# Patient Record
Sex: FEMALE | Race: WHITE | NOT HISPANIC OR LATINO | Employment: OTHER | URBAN - METROPOLITAN AREA
[De-identification: names, ages, dates, MRNs, and addresses within clinical notes are randomized per-mention and may not be internally consistent; named-entity substitution may affect disease eponyms.]

---

## 2017-05-01 DIAGNOSIS — Z12.31 ENCOUNTER FOR SCREENING MAMMOGRAM FOR MALIGNANT NEOPLASM OF BREAST: ICD-10-CM

## 2017-05-08 ENCOUNTER — TRANSCRIBE ORDERS (OUTPATIENT)
Dept: ADMINISTRATIVE | Facility: HOSPITAL | Age: 47
End: 2017-05-08

## 2017-05-08 DIAGNOSIS — Z12.31 VISIT FOR SCREENING MAMMOGRAM: Primary | ICD-10-CM

## 2017-05-18 ENCOUNTER — ALLSCRIPTS OFFICE VISIT (OUTPATIENT)
Dept: OTHER | Facility: OTHER | Age: 47
End: 2017-05-18

## 2017-05-23 LAB
HPV 18 (HISTORICAL): NOT DETECTED
HPV HIGH RISK 16/18 (HISTORICAL): NOT DETECTED
HPV16 (HISTORICAL): NOT DETECTED
PAP (HISTORICAL): NORMAL

## 2017-06-04 ENCOUNTER — APPOINTMENT (EMERGENCY)
Dept: RADIOLOGY | Facility: HOSPITAL | Age: 47
End: 2017-06-04
Payer: COMMERCIAL

## 2017-06-04 ENCOUNTER — HOSPITAL ENCOUNTER (EMERGENCY)
Facility: HOSPITAL | Age: 47
Discharge: HOME | End: 2017-06-04
Attending: EMERGENCY MEDICINE
Payer: COMMERCIAL

## 2017-06-04 VITALS
TEMPERATURE: 98.1 F | WEIGHT: 177.25 LBS | OXYGEN SATURATION: 97 % | RESPIRATION RATE: 16 BRPM | DIASTOLIC BLOOD PRESSURE: 71 MMHG | HEART RATE: 75 BPM | SYSTOLIC BLOOD PRESSURE: 132 MMHG

## 2017-06-04 DIAGNOSIS — R20.2 PARESTHESIAS: Primary | ICD-10-CM

## 2017-06-04 LAB
ANION GAP SERPL CALCULATED.3IONS-SCNC: 10 MMOL/L (ref 4–13)
ATRIAL RATE: 60 BPM
BASOPHILS # BLD AUTO: 0.05 THOUSANDS/ΜL (ref 0–0.1)
BASOPHILS NFR BLD AUTO: 1 % (ref 0–1)
BUN SERPL-MCNC: 13 MG/DL (ref 5–25)
CALCIUM SERPL-MCNC: 9.1 MG/DL (ref 8.3–10.1)
CHLORIDE SERPL-SCNC: 105 MMOL/L (ref 100–108)
CO2 SERPL-SCNC: 28 MMOL/L (ref 21–32)
CREAT SERPL-MCNC: 0.67 MG/DL (ref 0.6–1.3)
DEPRECATED D DIMER PPP: <270 NG/ML (FEU) (ref 0–424)
EOSINOPHIL # BLD AUTO: 0.08 THOUSAND/ΜL (ref 0–0.61)
EOSINOPHIL NFR BLD AUTO: 1 % (ref 0–6)
ERYTHROCYTE [DISTWIDTH] IN BLOOD BY AUTOMATED COUNT: 12.2 % (ref 11.6–15.1)
GFR SERPL CREATININE-BSD FRML MDRD: >60 ML/MIN/1.73SQ M
GLUCOSE SERPL-MCNC: 111 MG/DL (ref 65–140)
HCT VFR BLD AUTO: 39.4 % (ref 34.8–46.1)
HGB BLD-MCNC: 12.9 G/DL (ref 11.5–15.4)
LYMPHOCYTES # BLD AUTO: 2.38 THOUSANDS/ΜL (ref 0.6–4.47)
LYMPHOCYTES NFR BLD AUTO: 36 % (ref 14–44)
MCH RBC QN AUTO: 31.5 PG (ref 26.8–34.3)
MCHC RBC AUTO-ENTMCNC: 32.7 G/DL (ref 31.4–37.4)
MCV RBC AUTO: 96 FL (ref 82–98)
MONOCYTES # BLD AUTO: 0.68 THOUSAND/ΜL (ref 0.17–1.22)
MONOCYTES NFR BLD AUTO: 10 % (ref 4–12)
NEUTROPHILS # BLD AUTO: 3.43 THOUSANDS/ΜL (ref 1.85–7.62)
NEUTS SEG NFR BLD AUTO: 52 % (ref 43–75)
P AXIS: 62 DEGREES
PLATELET # BLD AUTO: 296 THOUSANDS/UL (ref 149–390)
PMV BLD AUTO: 10.2 FL (ref 8.9–12.7)
POTASSIUM SERPL-SCNC: 3.5 MMOL/L (ref 3.5–5.3)
PR INTERVAL: 192 MS
QRS AXIS: 51 DEGREES
QRSD INTERVAL: 92 MS
QT INTERVAL: 402 MS
QTC INTERVAL: 402 MS
RBC # BLD AUTO: 4.09 MILLION/UL (ref 3.81–5.12)
SODIUM SERPL-SCNC: 143 MMOL/L (ref 136–145)
T WAVE AXIS: 52 DEGREES
TROPONIN I SERPL-MCNC: <0.02 NG/ML
VENTRICULAR RATE: 60 BPM
WBC # BLD AUTO: 6.62 THOUSAND/UL (ref 4.31–10.16)

## 2017-06-04 PROCEDURE — 84484 ASSAY OF TROPONIN QUANT: CPT | Performed by: EMERGENCY MEDICINE

## 2017-06-04 PROCEDURE — 80048 BASIC METABOLIC PNL TOTAL CA: CPT | Performed by: EMERGENCY MEDICINE

## 2017-06-04 PROCEDURE — 71020 HB CHEST X-RAY 2VW FRONTAL&LATL: CPT

## 2017-06-04 PROCEDURE — 36415 COLL VENOUS BLD VENIPUNCTURE: CPT | Performed by: EMERGENCY MEDICINE

## 2017-06-04 PROCEDURE — 85025 COMPLETE CBC W/AUTO DIFF WBC: CPT | Performed by: EMERGENCY MEDICINE

## 2017-06-04 PROCEDURE — 85379 FIBRIN DEGRADATION QUANT: CPT | Performed by: EMERGENCY MEDICINE

## 2017-06-04 PROCEDURE — 99284 EMERGENCY DEPT VISIT MOD MDM: CPT

## 2017-06-04 PROCEDURE — 93005 ELECTROCARDIOGRAM TRACING: CPT | Performed by: EMERGENCY MEDICINE

## 2017-06-04 RX ORDER — METHYLPREDNISOLONE 4 MG/1
TABLET ORAL
Qty: 21 TABLET | Refills: 0 | Status: SHIPPED | OUTPATIENT
Start: 2017-06-04 | End: 2019-08-02

## 2017-06-15 ENCOUNTER — HOSPITAL ENCOUNTER (OUTPATIENT)
Dept: MAMMOGRAPHY | Facility: HOSPITAL | Age: 47
Discharge: HOME | End: 2017-06-15
Attending: OBSTETRICS & GYNECOLOGY
Payer: COMMERCIAL

## 2017-06-15 DIAGNOSIS — Z12.31 ENCOUNTER FOR SCREENING MAMMOGRAM FOR MALIGNANT NEOPLASM OF BREAST: ICD-10-CM

## 2017-06-15 PROCEDURE — G0202 SCR MAMMO BI INCL CAD: HCPCS

## 2018-01-13 VITALS
WEIGHT: 171 LBS | DIASTOLIC BLOOD PRESSURE: 88 MMHG | BODY MASS INDEX: 24.48 KG/M2 | HEIGHT: 70 IN | SYSTOLIC BLOOD PRESSURE: 142 MMHG

## 2018-09-26 ENCOUNTER — ANNUAL EXAM (OUTPATIENT)
Dept: OBGYN CLINIC | Facility: CLINIC | Age: 48
End: 2018-09-26
Payer: COMMERCIAL

## 2018-09-26 VITALS
HEIGHT: 70 IN | SYSTOLIC BLOOD PRESSURE: 118 MMHG | DIASTOLIC BLOOD PRESSURE: 82 MMHG | BODY MASS INDEX: 24.77 KG/M2 | WEIGHT: 173 LBS

## 2018-09-26 DIAGNOSIS — Z01.419 ENCOUNTER FOR GYNECOLOGICAL EXAMINATION WITHOUT ABNORMAL FINDING: Primary | ICD-10-CM

## 2018-09-26 DIAGNOSIS — Z12.39 SCREENING FOR MALIGNANT NEOPLASM OF BREAST: ICD-10-CM

## 2018-09-26 DIAGNOSIS — Z12.4 PAP SMEAR FOR CERVICAL CANCER SCREENING: ICD-10-CM

## 2018-09-26 PROCEDURE — S0612 ANNUAL GYNECOLOGICAL EXAMINA: HCPCS | Performed by: OBSTETRICS & GYNECOLOGY

## 2018-09-26 NOTE — PATIENT INSTRUCTIONS
Wellness Visit for Adults   AMBULATORY CARE:   A wellness visit  is when you see your healthcare provider to get screened for health problems  You can also get advice on how to stay healthy  Write down your questions so you remember to ask them  Ask your healthcare provider how often you should have a wellness visit  What happens at a wellness visit:  Your healthcare provider will ask about your health, and your family history of health problems  This includes high blood pressure, heart disease, and cancer  He or she will ask if you have symptoms that concern you, if you smoke, and about your mood  You may also be asked about your intake of medicines, supplements, food, and alcohol  Any of the following may be done:  · Your weight  will be checked  Your height may also be checked so your body mass index (BMI) can be calculated  Your BMI shows if you are at a healthy weight  · Your blood pressure  and heart rate will be checked  Your temperature may also be checked  · Blood and urine tests  may be done  Blood tests may be done to check your cholesterol levels  Abnormal cholesterol levels increase your risk for heart disease and stroke  You may also need a blood or urine test to check for diabetes if you are at increased risk  Urine tests may be done to look for signs of an infection or kidney disease  · A physical exam  includes checking your heartbeat and lungs with a stethoscope  Your healthcare provider may also check your skin to look for sun damage  · Screening tests  may be recommended  A screening test is done to check for diseases that may not cause symptoms  The screening tests you may need depend on your age, gender, family history, and lifestyle habits  For example, colorectal screening may be recommended if you are 48years old or older  Screening tests you need if you are a woman:   · A Pap smear  is used to screen for cervical cancer   Pap smears are usually done every 3 to 5 years depending on your age  You may need them more often if you have had abnormal Pap smear test results in the past  Ask your healthcare provider how often you should have a Pap smear  · A mammogram  is an x-ray of your breasts to screen for breast cancer  Experts recommend mammograms every 2 years starting at age 48 years  You may need a mammogram at age 52 years or younger if you have an increased risk for breast cancer  Talk to your healthcare provider about when you should start having mammograms and how often you need them  Vaccines you may need:   · Get an influenza vaccine  every year  The influenza vaccine protects you from the flu  Several types of viruses cause the flu  The viruses change over time, so new vaccines are made each year  · Get a tetanus-diphtheria (Td) booster vaccine  every 10 years  This vaccine protects you against tetanus and diphtheria  Tetanus is a severe infection that may cause painful muscle spasms and lockjaw  Diphtheria is a severe bacterial infection that causes a thick covering in the back of your mouth and throat  · Get a human papillomavirus (HPV) vaccine  if you are female and aged 23 to 32 or male 23 to 24 and never received it  This vaccine protects you from HPV infection  HPV is the most common infection spread by sexual contact  HPV may also cause vaginal, penile, and anal cancers  · Get a pneumococcal vaccine  if you are aged 72 years or older  The pneumococcal vaccine is an injection given to protect you from pneumococcal disease  Pneumococcal disease is an infection caused by pneumococcal bacteria  The infection may cause pneumonia, meningitis, or an ear infection  · Get a shingles vaccine  if you are aged 61 or older, even if you have had shingles before  The shingles vaccine is an injection to protect you from the varicella-zoster virus  This is the same virus that causes chickenpox   Shingles is a painful rash that develops in people who had chickenpox or have been exposed to the virus  How to eat healthy:  My Plate is a model for planning healthy meals  It shows the types and amounts of foods that should go on your plate  Fruits and vegetables make up about half of your plate, and grains and protein make up the other half  A serving of dairy is included on the side of your plate  The amount of calories and serving sizes you need depends on your age, gender, weight, and height  Examples of healthy foods are listed below:  · Eat a variety of vegetables  such as dark green, red, and orange vegetables  You can also include canned vegetables low in sodium (salt) and frozen vegetables without added butter or sauces  · Eat a variety of fresh fruits , canned fruit in 100% juice, frozen fruit, and dried fruit  · Include whole grains  At least half of the grains you eat should be whole grains  Examples include whole-wheat bread, wheat pasta, brown rice, and whole-grain cereals such as oatmeal     · Eat a variety of protein foods such as seafood (fish and shellfish), lean meat, and poultry without skin (turkey and chicken)  Examples of lean meats include pork leg, shoulder, or tenderloin, and beef round, sirloin, tenderloin, and extra lean ground beef  Other protein foods include eggs and egg substitutes, beans, peas, soy products, nuts, and seeds  · Choose low-fat dairy products such as skim or 1% milk or low-fat yogurt, cheese, and cottage cheese  · Limit unhealthy fats  such as butter, hard margarine, and shortening  Exercise:  Exercise at least 30 minutes per day on most days of the week  Some examples of exercise include walking, biking, dancing, and swimming  You can also fit in more physical activity by taking the stairs instead of the elevator or parking farther away from stores  Include muscle strengthening activities 2 days each week  Regular exercise provides many health benefits   It helps you manage your weight, and decreases your risk for type 2 diabetes, heart disease, stroke, and high blood pressure  Exercise can also help improve your mood  Ask your healthcare provider about the best exercise plan for you  General health and safety guidelines:   · Do not smoke  Nicotine and other chemicals in cigarettes and cigars can cause lung damage  Ask your healthcare provider for information if you currently smoke and need help to quit  E-cigarettes or smokeless tobacco still contain nicotine  Talk to your healthcare provider before you use these products  · Limit alcohol  A drink of alcohol is 12 ounces of beer, 5 ounces of wine, or 1½ ounces of liquor  · Lose weight, if needed  Being overweight increases your risk of certain health conditions  These include heart disease, high blood pressure, type 2 diabetes, and certain types of cancer  · Protect your skin  Do not sunbathe or use tanning beds  Use sunscreen with a SPF 15 or higher  Apply sunscreen at least 15 minutes before you go outside  Reapply sunscreen every 2 hours  Wear protective clothing, hats, and sunglasses when you are outside  · Drive safely  Always wear your seatbelt  Make sure everyone in your car wears a seatbelt  A seatbelt can save your life if you are in an accident  Do not use your cell phone when you are driving  This could distract you and cause an accident  Pull over if you need to make a call or send a text message  · Practice safe sex  Use latex condoms if are sexually active and have more than one partner  Your healthcare provider may recommend screening tests for sexually transmitted infections (STIs)  · Wear helmets, lifejackets, and protective gear  Always wear a helmet when you ride a bike or motorcycle, go skiing, or play sports that could cause a head injury  Wear protective equipment when you play sports  Wear a lifejacket when you are on a boat or doing water sports    © 2017 [address] [employer] Information is for End User's use only and may not be sold, redistributed or otherwise used for commercial purposes  All illustrations and images included in CareNotes® are the copyrighted property of A D A M , Inc  or Cortez Almaguer  The above information is an  only  It is not intended as medical advice for individual conditions or treatments  Talk to your doctor, nurse or pharmacist before following any medical regimen to see if it is safe and effective for you  Thank you for enrolling in [facility]  Please follow the instructions below to securely access your online medical record  [employer] allows you to send messages to your doctor, view your test results, renew your prescriptions, schedule appointments, and more  [employer] uses Single Sign on (SSO) Technology for you to log in and access our [employer], including [employer]  No more remembering multiple user names and passwords! We are going to guide you through, step by step, to help you set up your [employer] account which will provide access to your [employer] account  How Do I Sign Up? 1  In your Internet browser, go to [email or web address]       2  Click on the [facility] patient account and then click Dont have an                 Account? Create one now      3  Enter your demographic information and chose a user name (email address) and password  Think of one that is secure and easy to remember  Enter a Referral code if you have one (this is not your [employer] code ) Accept the Terms and Conditions and the Privacy Policy  4  Select your security questions that you will use to reset your password should you forget it  Click Submit  5  Enter your [employer] Activation Code exactly as it appears below  You will not need to use this code after you have completed the sign-up process  If you do not sign up before the expiration date, you must request a new code                           [employer] Activation Code: [email or web address]  Expires: 10/10/2018  5:28 PM    6  Confirm your email address  An email confirmation was sent to you  Please open that email and click Confirm your Email   You should then be redirected to our [address] [place] [address] Single sign on page, where you will log on with the user name and password you created! Proceed to the [employer] Icon to view your personal health information  Additional Information  If you have questions, you can e-mail patient  [email or web address] or call [phone] to talk to our customer support staff  Remember, [employer] is NOT to be used for urgent needs  For medical emergencies, dial 911

## 2018-09-26 NOTE — PROGRESS NOTES
Assessment/Plan:     Normal annual gynecological exam  Pap smear is done  Script is given for mammogram which is due shortly  Reviewed options for treatment of heavy menstrual flow       Problem List Items Addressed This Visit     Encounter for gynecological examination without abnormal finding - Primary    Relevant Orders    GP Liquid-Based Pap + HPV Plus    Pap smear for cervical cancer screening      Other Visit Diagnoses     Screening for malignant neoplasm of breast        Relevant Orders    Mammo screening bilateral w cad            Subjective:      Patient ID: Joshua Estrada is a 57 y o  female  Here for annual gyn exam - overall well - menses are regular and heavy but not cramping -  she sometimes feels that they are heavier than they should be, but she only uses tampons at night and then pads during the day, she is not sure why  She has tried Motrin occasionally to slow down the flow and has helped but sometimes she forgets take  We briefly discussed some of the other options available to decrease menstrual flow, at this time she feels that she should go ahead and try the Motrin on a regular basis with her menses as well as try to use a tampon to decrease some of which she feels is heavy flow  She otherwise has no other complaints  Bowels and bladder are normal   She is due for her mammogram and will give her a slip for that  She has a colonoscopy scheduled the next few months  She has had blood work within the last year or so and otherwise no other medical issues  She still struggles with some of the issues were 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  The following portions of the patient's history were reviewed and updated as appropriate:   She  has no past medical history on file    She   Patient Active Problem List    Diagnosis Date Noted    Pap smear for cervical cancer screening 09/27/2018    Encounter for gynecological examination without abnormal finding 09/26/2018     She  has a past surgical history that includes Hernia repair and Cervical cone biopsy  Her family history includes Diabetes in her family; Heart disease in her family; Hypertension in her family; Lymphoma in her sister  She  reports that she has quit smoking  She has never used smokeless tobacco  She reports that she drinks about 1 8 oz of alcohol per week   She reports that she does not use drugs  Current Outpatient Prescriptions   Medication Sig Dispense Refill    Methylprednisolone 4 MG TBPK Use as directed on package 21 tablet 0     No current facility-administered medications for this visit  Current Outpatient Prescriptions on File Prior to Visit   Medication Sig    Methylprednisolone 4 MG TBPK Use as directed on package     No current facility-administered medications on file prior to visit  She is allergic to penicillins       Review of Systems   Constitutional: Negative for fatigue, fever and unexpected weight change  HENT: Negative for dental problem, mouth sores, nosebleeds, rhinorrhea, sinus pain, sinus pressure and sore throat  Eyes: Negative for pain, discharge and visual disturbance  Respiratory: Negative for cough, chest tightness, shortness of breath and wheezing  Cardiovascular: Negative for chest pain, palpitations and leg swelling  Gastrointestinal: Negative for blood in stool, constipation, diarrhea, nausea and vomiting  Endocrine: Negative for polydipsia  Genitourinary: Negative for difficulty urinating, dyspareunia, dysuria, menstrual problem, pelvic pain, urgency, vaginal discharge and vaginal pain  Musculoskeletal: Negative for arthralgias, back pain and joint swelling  Allergic/Immunologic: Negative for environmental allergies  Neurological: Negative for seizures, light-headedness and headaches  Hematological: Does not bruise/bleed easily  Psychiatric/Behavioral: Negative for sleep disturbance  The patient is not nervous/anxious  Objective: There were no vitals taken for this visit  Physical Exam   Constitutional: She is oriented to person, place, and time  She appears well-developed and well-nourished  Tall young white female no apparent distress   HENT:   Head: Normocephalic and atraumatic  Neck: Normal range of motion  Neck supple  No thyromegaly present  Cardiovascular: Normal rate and regular rhythm  Pulmonary/Chest: Effort normal and breath sounds normal  No respiratory distress  She has no wheezes  She has no rales  She exhibits no tenderness  Right breast exhibits no inverted nipple, no mass, no nipple discharge, no skin change and no tenderness  Left breast exhibits no inverted nipple, no mass, no nipple discharge, no skin change and no tenderness  Breasts are symmetrical    Abdominal: Soft  She exhibits no distension and no mass  There is no tenderness  There is no rebound and no guarding  Genitourinary:   Genitourinary Comments: Normal female, no vulvar or vaginal lesions, cervix is grossly normal appearance and Pap smear is taken, uterus is anterior normal in size shape and mobility, no adnexal masses are palpated, and the exam is nontender  Rectal exam reveals normal sphincter tone, no masses and the stool is guaiac negative   Neurological: She is alert and oriented to person, place, and time  Psychiatric: She has a normal mood and affect  Her behavior is normal    Vitals reviewed

## 2018-09-27 PROBLEM — Z12.4 PAP SMEAR FOR CERVICAL CANCER SCREENING: Status: ACTIVE | Noted: 2018-09-27

## 2018-10-01 LAB
HPV HR 12 DNA CVX QL NAA+PROBE: NOT DETECTED
HPV16 DNA SPEC QL NAA+PROBE: NOT DETECTED
HPV18 DNA SPEC QL NAA+PROBE: NOT DETECTED
THIN PREP CVX: NORMAL

## 2019-08-01 ENCOUNTER — HOSPITAL ENCOUNTER (OUTPATIENT)
Dept: RADIOLOGY | Facility: HOSPITAL | Age: 49
Discharge: HOME | End: 2019-08-01
Attending: OBSTETRICS & GYNECOLOGY
Payer: COMMERCIAL

## 2019-08-01 VITALS — BODY MASS INDEX: 23.62 KG/M2 | WEIGHT: 165 LBS | HEIGHT: 70 IN

## 2019-08-01 DIAGNOSIS — Z12.39 SCREENING FOR MALIGNANT NEOPLASM OF BREAST: ICD-10-CM

## 2019-08-01 PROCEDURE — 77063 BREAST TOMOSYNTHESIS BI: CPT

## 2019-08-01 PROCEDURE — 77067 SCR MAMMO BI INCL CAD: CPT

## 2019-08-02 ENCOUNTER — APPOINTMENT (EMERGENCY)
Dept: RADIOLOGY | Facility: HOSPITAL | Age: 49
End: 2019-08-02
Payer: COMMERCIAL

## 2019-08-02 ENCOUNTER — HOSPITAL ENCOUNTER (EMERGENCY)
Facility: HOSPITAL | Age: 49
Discharge: HOME | End: 2019-08-02
Attending: EMERGENCY MEDICINE | Admitting: EMERGENCY MEDICINE
Payer: COMMERCIAL

## 2019-08-02 ENCOUNTER — TELEPHONE (OUTPATIENT)
Dept: OBGYN CLINIC | Facility: CLINIC | Age: 49
End: 2019-08-02

## 2019-08-02 ENCOUNTER — TELEPHONE (OUTPATIENT)
Dept: MAMMOGRAPHY | Facility: CLINIC | Age: 49
End: 2019-08-02

## 2019-08-02 VITALS
WEIGHT: 165 LBS | HEART RATE: 86 BPM | DIASTOLIC BLOOD PRESSURE: 80 MMHG | TEMPERATURE: 98.1 F | OXYGEN SATURATION: 97 % | RESPIRATION RATE: 18 BRPM | SYSTOLIC BLOOD PRESSURE: 137 MMHG | BODY MASS INDEX: 23.68 KG/M2

## 2019-08-02 DIAGNOSIS — N92.0 MENORRHAGIA WITH REGULAR CYCLE: Primary | ICD-10-CM

## 2019-08-02 DIAGNOSIS — D25.9 UTERINE FIBROID: Primary | ICD-10-CM

## 2019-08-02 DIAGNOSIS — N93.9 VAGINAL BLEEDING: ICD-10-CM

## 2019-08-02 LAB
ANION GAP SERPL CALCULATED.3IONS-SCNC: 8 MMOL/L (ref 4–13)
BACTERIA UR QL AUTO: ABNORMAL /HPF
BASOPHILS # BLD AUTO: 0.08 THOUSANDS/ΜL (ref 0–0.1)
BASOPHILS NFR BLD AUTO: 1 % (ref 0–1)
BILIRUB UR QL STRIP: NEGATIVE
BUN SERPL-MCNC: 9 MG/DL (ref 5–25)
CALCIUM SERPL-MCNC: 9.2 MG/DL (ref 8.3–10.1)
CHLORIDE SERPL-SCNC: 107 MMOL/L (ref 100–108)
CLARITY UR: CLEAR
CLARITY, POC: CLEAR
CO2 SERPL-SCNC: 26 MMOL/L (ref 21–32)
COLOR UR: YELLOW
COLOR, POC: YELLOW
CREAT SERPL-MCNC: 0.6 MG/DL (ref 0.6–1.3)
EOSINOPHIL # BLD AUTO: 0.09 THOUSAND/ΜL (ref 0–0.61)
EOSINOPHIL NFR BLD AUTO: 1 % (ref 0–6)
ERYTHROCYTE [DISTWIDTH] IN BLOOD BY AUTOMATED COUNT: 12.3 % (ref 11.6–15.1)
EXT PREG TEST URINE: NEGATIVE
EXT. CONTROL ED NAV: NORMAL
GFR SERPL CREATININE-BSD FRML MDRD: 110 ML/MIN/1.73SQ M
GLUCOSE SERPL-MCNC: 88 MG/DL (ref 65–140)
GLUCOSE UR STRIP-MCNC: NEGATIVE MG/DL
HCT VFR BLD AUTO: 37.7 % (ref 34.8–46.1)
HGB BLD-MCNC: 12.3 G/DL (ref 11.5–15.4)
HGB UR QL STRIP.AUTO: ABNORMAL
HYALINE CASTS #/AREA URNS LPF: ABNORMAL /LPF
IMM GRANULOCYTES # BLD AUTO: 0.01 THOUSAND/UL (ref 0–0.2)
IMM GRANULOCYTES NFR BLD AUTO: 0 % (ref 0–2)
KETONES UR STRIP-MCNC: ABNORMAL MG/DL
LEUKOCYTE ESTERASE UR QL STRIP: NEGATIVE
LYMPHOCYTES # BLD AUTO: 1.79 THOUSANDS/ΜL (ref 0.6–4.47)
LYMPHOCYTES NFR BLD AUTO: 26 % (ref 14–44)
MCH RBC QN AUTO: 31.8 PG (ref 26.8–34.3)
MCHC RBC AUTO-ENTMCNC: 32.6 G/DL (ref 31.4–37.4)
MCV RBC AUTO: 97 FL (ref 82–98)
MONOCYTES # BLD AUTO: 0.72 THOUSAND/ΜL (ref 0.17–1.22)
MONOCYTES NFR BLD AUTO: 11 % (ref 4–12)
NEUTROPHILS # BLD AUTO: 4.14 THOUSANDS/ΜL (ref 1.85–7.62)
NEUTS SEG NFR BLD AUTO: 61 % (ref 43–75)
NITRITE UR QL STRIP: NEGATIVE
NON-SQ EPI CELLS URNS QL MICRO: ABNORMAL /HPF
NRBC BLD AUTO-RTO: 0 /100 WBCS
PH UR STRIP.AUTO: 6.5 [PH] (ref 4.5–8)
PLATELET # BLD AUTO: 268 THOUSANDS/UL (ref 149–390)
PMV BLD AUTO: 10.1 FL (ref 8.9–12.7)
POTASSIUM SERPL-SCNC: 3.7 MMOL/L (ref 3.5–5.3)
PROT UR STRIP-MCNC: NEGATIVE MG/DL
RBC # BLD AUTO: 3.87 MILLION/UL (ref 3.81–5.12)
RBC #/AREA URNS AUTO: ABNORMAL /HPF
SODIUM SERPL-SCNC: 141 MMOL/L (ref 136–145)
SP GR UR STRIP.AUTO: 1.01 (ref 1–1.03)
UROBILINOGEN UR QL STRIP.AUTO: 0.2 E.U./DL
WBC # BLD AUTO: 6.83 THOUSAND/UL (ref 4.31–10.16)
WBC #/AREA URNS AUTO: ABNORMAL /HPF

## 2019-08-02 PROCEDURE — 85025 COMPLETE CBC W/AUTO DIFF WBC: CPT | Performed by: EMERGENCY MEDICINE

## 2019-08-02 PROCEDURE — 99242 OFF/OP CONSLTJ NEW/EST SF 20: CPT | Performed by: OBSTETRICS & GYNECOLOGY

## 2019-08-02 PROCEDURE — 99284 EMERGENCY DEPT VISIT MOD MDM: CPT

## 2019-08-02 PROCEDURE — 99284 EMERGENCY DEPT VISIT MOD MDM: CPT | Performed by: EMERGENCY MEDICINE

## 2019-08-02 PROCEDURE — 76856 US EXAM PELVIC COMPLETE: CPT

## 2019-08-02 PROCEDURE — 76830 TRANSVAGINAL US NON-OB: CPT

## 2019-08-02 PROCEDURE — 81001 URINALYSIS AUTO W/SCOPE: CPT

## 2019-08-02 PROCEDURE — 81025 URINE PREGNANCY TEST: CPT | Performed by: EMERGENCY MEDICINE

## 2019-08-02 PROCEDURE — 80048 BASIC METABOLIC PNL TOTAL CA: CPT | Performed by: EMERGENCY MEDICINE

## 2019-08-02 PROCEDURE — 36415 COLL VENOUS BLD VENIPUNCTURE: CPT | Performed by: EMERGENCY MEDICINE

## 2019-08-02 RX ORDER — TRANEXAMIC ACID 650 1/1
2 TABLET ORAL 3 TIMES DAILY PRN
Qty: 30 TABLET | Refills: 3 | Status: SHIPPED | OUTPATIENT
Start: 2019-08-02 | End: 2019-11-12 | Stop reason: ALTCHOICE

## 2019-08-02 NOTE — TELEPHONE ENCOUNTER
Email sent to pt at [email or web address]    Suleman Mitchelljono Arias,    Your follow up appointment is at [address] [place] [address], [id number] [place] [address], [address] [place] [address]    Your appointment is on Wednesday August 14th at 11:00am   Please arrive 10-15 minutes early to register  We have contacted your doctor to obtain your script so you dont need to bring that with you to the appointment  Just as a reminder: Do not wear any perfume, powder, lotion or deodorant on the Right breast or underarm area  If you have any questions or need to change your appointment, please dont hesitate to call me at the number below  Thanks,  Liya Diaz, MSN, RN, [address]  [address], [address]  [address]: [phone]  FX: [phone]  [email or web address]

## 2019-08-02 NOTE — CONSULTS
58 y/o  with heavy gushing menses with blood and clot and significant cramps from earlier today  Patient reported around midday was soaking 2 maxi pad every hour  Patient seen by ER staff and had ultrasound  Bleeding now significantly decreased  Had reported heavier menses to Dr Stephanie Riggs at time of annual last  and they had reviewed some options at that time  Reviewed with patient current finding of 8 cm submucosal fibroid and possible options to treat including lysteda, ocp, uterine artery embolization, and hysterectomy  Generally reviewed risks and benefits of each option and that currently as bleeding has slowed would watch bleeding, use motrin as needed for cramping and heavy flow, and follow up in office  Patient requested and was sent a script for lysteda to her home pharmacy as they are travelling next week in case bleeding increases again  Patient reports no self or family history of clot and is not a smoker  Fully counseled  OB History        2    Para   2    Term   2            AB        Living           SAB        TAB        Ectopic        Multiple        Live Births   2               History reviewed  No pertinent past medical history  Past Surgical History:   Procedure Laterality Date    CERVICAL CONE BIOPSY      HERNIA REPAIR           Current Facility-Administered Medications:     sodium chloride 0 9 % bolus 1,000 mL, 1,000 mL, Intravenous, Once, Ruth Garcia MD    Current Outpatient Medications:     Tranexamic Acid 650 MG TABS, Take 2 tablets by mouth 3 (three) times a day as needed (heavy menses), Disp: 30 tablet, Rfl: 3    Allergies   Allergen Reactions    Penicillins Rash       Social History     Tobacco Use    Smoking status: Former Smoker    Smokeless tobacco: Never Used   Substance Use Topics    Alcohol use:  Yes     Alcohol/week: 3 0 standard drinks     Types: 3 Glasses of wine per week     Comment: weekly    Drug use: No       Vitals: 08/02/19 1340 08/02/19 1541   BP: 163/89 137/80   BP Location:  Right arm   Pulse: 75 86   Resp: 19 18   Temp: 98 1 °F (36 7 °C)    SpO2: 98% 97%   Weight: 74 8 kg (165 lb)        Pelvic: some dark blood, no active bleeding, no CMT, uterus mildly tender anteverted about 12 week size  Lab Results   Component Value Date    WBC 6 83 08/02/2019    HGB 12 3 08/02/2019    HCT 37 7 08/02/2019    MCV 97 08/02/2019     08/02/2019     PELVIC ULTRASOUND, COMPLETE     INDICATION: Vaginal bleeding with lower abdominal pain      COMPARISON: Pelvic ultrasound 8/27/2010     TECHNIQUE:   Transabdominal pelvic ultrasound was performed in sagittal and transverse planes with a curvilinear transducer  Additional transvaginal imaging was performed to better evaluate the endometrium and ovaries  Imaging included volumetric   sweeps as well as traditional still imaging technique      FINDINGS:     UTERUS:  The uterus is anteverted in position, measuring 14 0 x 9 1 x 9 8 cm  Contour appears normal    8 0 x 6 8 x 7 3 cm well-circumscribed, hypoechoic uterine body/fundus mass  This causes displacement/distortion of the endometrium      The cervix shows no suspicious abnormality      ENDOMETRIUM:    Thickened endometrium measuring up to 21 mm is seen  There is internal vascularity noted with arterial flow       OVARIES/ADNEXA:  Right ovary:  2 1 x 1 1 x 1 5 cm  No suspicious right ovarian abnormality  Doppler flow within normal limits      Left ovary:  3 1 x 1 9 x 2 0 cm  No suspicious left ovarian abnormality  Doppler flow within normal limits      No suspicious adnexal mass or loculated collections  There is no free fluid      IMPRESSION:     8 cm well-circumscribed posterior uterine body/fundus mass causing displacement/distortion of the endometrial stripe most compatible with a submucosal fibroid      Endometrium is thickened and vascular, significance unclear  Follow-up may be based on clinical grounds  Unremarkable ovaries  Assessment: 56 y/o  submucosal fibroid heavy menses now resolving, stable vitals and hgb  Plan: follow up in office, use lysteda and motrin prn as needed  Consider options to treat as discussed

## 2019-08-02 NOTE — ED PROVIDER NOTES
History  Chief Complaint   Patient presents with    Vaginal Bleeding     2d of excessive vaginal bleeding  +dizziness and feeling faint  +cramping  +hot flashes  doubled up on pads Qhour with clots  HPI  57 y o  Female  presents to the ED with heavy vaginal bleeding on her menstrual period  Pt reports she has had heavy bleeding during her menstrual period for several months, and was told by her OBGYN that this was normal jus-menopause  The first day of her period this month was two days ago, and pt reports she has noticed even more bleeding than usual  States she is soaking a pad and a tampon every hour and is passing small clots  Also reports feeling lightheaded today while sitting  Notes some mild lower abdominal cramping  She called her OBGYN's office and was advised to come to the ED  Pt denies hx ovarian cysts or uterine fibroids  She has hx 1 C section and 1 vaginal delivery  Prior to Admission Medications   Prescriptions Last Dose Informant Patient Reported? Taking? Tranexamic Acid 650 MG TABS   No No   Sig: Take 2 tablets by mouth 3 (three) times a day as needed (heavy menses)      Facility-Administered Medications: None       History reviewed  No pertinent past medical history  Past Surgical History:   Procedure Laterality Date    CERVICAL CONE BIOPSY      HERNIA REPAIR         Family History   Problem Relation Age of Onset    Lymphoma Sister 47        non hodgkins    Hypertension Family     Diabetes Family     Heart disease Family     No Known Problems Sister     No Known Problems Sister     No Known Problems Sister      I have reviewed and agree with the history as documented  Social History     Tobacco Use    Smoking status: Former Smoker    Smokeless tobacco: Never Used   Substance Use Topics    Alcohol use:  Yes     Alcohol/week: 3 0 standard drinks     Types: 3 Glasses of wine per week     Comment: weekly    Drug use: No        Review of Systems   Constitutional: Negative for chills and fever  HENT: Negative for congestion, rhinorrhea and sore throat  Eyes: Negative for visual disturbance  Respiratory: Negative for chest tightness and shortness of breath  Cardiovascular: Negative for chest pain  Gastrointestinal: Positive for abdominal pain  Negative for diarrhea, nausea and vomiting  Genitourinary: Positive for vaginal bleeding  Negative for dysuria and hematuria  Musculoskeletal: Negative for arthralgias and myalgias  Skin: Negative for rash  Neurological: Positive for light-headedness  Negative for dizziness, syncope, weakness, numbness and headaches  Hematological: Does not bruise/bleed easily  All other systems reviewed and are negative  Physical Exam  ED Triage Vitals   Temperature Pulse Respirations Blood Pressure SpO2   08/02/19 1340 08/02/19 1340 08/02/19 1340 08/02/19 1340 08/02/19 1340   98 1 °F (36 7 °C) 75 19 163/89 98 %      Temp src Heart Rate Source Patient Position - Orthostatic VS BP Location FiO2 (%)   -- 08/02/19 1340 08/02/19 1541 08/02/19 1541 --    Monitor Sitting Right arm       Pain Score       08/02/19 1340       6             Orthostatic Vital Signs  Vitals:    08/02/19 1340 08/02/19 1541   BP: 163/89 137/80   Pulse: 75 86   Patient Position - Orthostatic VS:  Sitting       Physical Exam   Constitutional: She is oriented to person, place, and time  She appears well-developed and well-nourished  No distress  HENT:   Head: Normocephalic and atraumatic  Right Ear: External ear normal    Left Ear: External ear normal    Nose: Nose normal    Eyes: Pupils are equal, round, and reactive to light  Conjunctivae and EOM are normal    Neck: Normal range of motion  Neck supple  Cardiovascular: Normal rate, regular rhythm, normal heart sounds and intact distal pulses  Exam reveals no gallop and no friction rub  No murmur heard  Pulmonary/Chest: Effort normal and breath sounds normal  No respiratory distress   She has no wheezes  She has no rhonchi  She has no rales  Abdominal: Soft  Bowel sounds are normal  She exhibits no distension and no mass  There is no tenderness  There is no rebound and no guarding  Genitourinary: Rectum normal  There is no rash or tenderness on the right labia  There is no rash or tenderness on the left labia  Cervix exhibits no motion tenderness and no discharge  Right adnexum displays no mass and no tenderness  Left adnexum displays no mass and no tenderness  There is bleeding (significant blood pooling in the vagina) in the vagina  No tenderness in the vagina  No vaginal discharge found  Musculoskeletal: Normal range of motion  Lymphadenopathy:     She has no cervical adenopathy  Neurological: She is alert and oriented to person, place, and time  She has normal strength  No cranial nerve deficit or sensory deficit  Skin: Skin is warm and dry  She is not diaphoretic  Psychiatric: She has a normal mood and affect  Nursing note and vitals reviewed        ED Medications  Medications   sodium chloride 0 9 % bolus 1,000 mL (1,000 mL Intravenous Not Given 8/2/19 1415)       Diagnostic Studies  Results Reviewed     Procedure Component Value Units Date/Time    Urine Microscopic [[id number]]  (Abnormal) Collected:  08/02/19 1433    Lab Status:  Final result Specimen:  Urine, Clean Catch Updated:  08/02/19 1507     RBC, UA 10-20 /hpf      WBC, UA None Seen /hpf      Epithelial Cells None Seen /hpf      Bacteria, UA None Seen /hpf      Hyaline Casts, UA None Seen /lpf     Basic metabolic panel [[id number]] Collected:  08/02/19 1414    Lab Status:  Final result Specimen:  Blood from Arm, Left Updated:  08/02/19 1447     Sodium 141 mmol/L      Potassium 3 7 mmol/L      Chloride 107 mmol/L      CO2 26 mmol/L      ANION GAP 8 mmol/L      BUN 9 mg/dL      Creatinine 0 60 mg/dL      Glucose 88 mg/dL      Calcium 9 2 mg/dL      eGFR 110 ml/min/1 73sq m     Narrative:       [place] guidelines for Chronic Kidney Disease (CKD):     Stage 1 with normal or high GFR (GFR > 90 mL/min/1 73 square meters)    Stage 2 Mild CKD (GFR = 60-89 mL/min/1 73 square meters)    Stage 3A Moderate CKD (GFR = 45-59 mL/min/1 73 square meters)    Stage 3B Moderate CKD (GFR = 30-44 mL/min/1 73 square meters)    Stage 4 Severe CKD (GFR = 15-29 mL/min/1 73 square meters)    Stage 5 End Stage CKD (GFR <15 mL/min/1 73 square meters)  Note: GFR calculation is accurate only with a steady state creatinine    CBC and differential [[id number]] Collected:  08/02/19 1414    Lab Status:  Final result Specimen:  Blood from Arm, Left Updated:  08/02/19 1436     WBC 6 83 Thousand/uL      RBC 3 87 Million/uL      Hemoglobin 12 3 g/dL      Hematocrit 37 7 %      MCV 97 fL      MCH 31 8 pg      MCHC 32 6 g/dL      RDW 12 3 %      MPV 10 1 fL      Platelets 095 Thousands/uL      nRBC 0 /100 WBCs      Neutrophils Relative 61 %      Immat GRANS % 0 %      Lymphocytes Relative 26 %      Monocytes Relative 11 %      Eosinophils Relative 1 %      Basophils Relative 1 %      Neutrophils Absolute 4 14 Thousands/µL      Immature Grans Absolute 0 01 Thousand/uL      Lymphocytes Absolute 1 79 Thousands/µL      Monocytes Absolute 0 72 Thousand/µL      Eosinophils Absolute 0 09 Thousand/µL      Basophils Absolute 0 08 Thousands/µL     POCT pregnancy, urine [[id number]]  (Normal) Resulted:  08/02/19 1429    Lab Status:  Final result Updated:  08/02/19 1430     EXT PREG TEST UR (Ref: Negative) NEGATIVE     Control VALID    POCT urinalysis dipstick [[id number]]  (Normal) Resulted:  08/02/19 1429    Lab Status:  Final result Updated:  08/02/19 1429     Color, UA YELLOW     Clarity, UA CLEAR    ED Urine Macroscopic [[id number]]  (Abnormal) Collected:  08/02/19 1433    Lab Status:  Final result Specimen:  Urine Updated:  08/02/19 1427     Color, UA Yellow     Clarity, UA Clear     pH, UA 6 5     Leukocytes, UA Negative     Nitrite, UA Negative     Protein, UA Negative mg/dl      Glucose, UA Negative mg/dl      Ketones, UA Trace mg/dl      Urobilinogen, UA 0 2 E U /dl      Bilirubin, UA Negative     Blood, UA Moderate     Specific [place], UA 1 015    Narrative:       CLINITEK RESULT                 US pelvis complete w transvaginal   Final Result by Tamera Everett DO (08/02 1602)       8 cm well-circumscribed posterior uterine body/fundus mass causing displacement/distortion of the endometrial stripe most compatible with a submucosal fibroid  Endometrium is thickened and vascular, significance unclear  Follow-up may be based on clinical grounds  Unremarkable ovaries  Workstation performed: [id number]               Procedures  Procedures        ED Course  ED Course as of Aug 02 1724   Fri Aug 02, 2019   1617 Concerning for submucosal fibroid  Paged gyn  US pelvis complete w transvaginal   1723 Seen by Dr Freida Mak, recommends follow up in office and they will prescribe TXA                                  MDM  Vaginal bleeding  Normal perimenopausal bleeding vs  Ruptured ovarian cyst, uterine fibroid, other acute process  Will get UA, Upreg, and pelvic ultrasound  Will get CBC and BMP to evaluate for anemia and electrolyte abnormality  Will consult with gynecology       Disposition  Final diagnoses:   Uterine fibroid   Vaginal bleeding     Time reflects when diagnosis was documented in both MDM as applicable and the Disposition within this note     Time User Action Codes Description Comment    8/2/2019  5:22 PM Kervin Boss [D25 9] Uterine fibroid     8/2/2019  5:22 PM Kervin Boss [N93 8] DUB (dysfunctional uterine bleeding)     8/2/2019  5:22 PM Nitin Gallagher [N93 8] DUB (dysfunctional uterine bleeding)     8/2/2019  5:22 PM Kervin Enciso Add [N93 9] Vaginal bleeding       ED Disposition     ED Disposition Condition Date/Time Comment    Discharge Stable Fri Aug 2, 2019  5:22 PM Casey Lux discharge to home/self care  Follow-up Information     Follow up With Specialties Details Why Contact Info    Nick Hendrickson MD Obstetrics and Gynecology, Obstetrics, Gynecology Schedule an appointment as soon as possible for a visit   [address]  [phone]            Patient's Medications   Discharge Prescriptions    TRANEXAMIC ACID 650 MG TABS    Take 2 tablets by mouth 3 (three) times a day as needed (heavy menses)       Start Date: 8/2/2019  End Date: --       Order Dose: 2 tablets       Quantity: 30 tablet    Refills: 3     No discharge procedures on file  ED Provider  Attending physically available and evaluated Camilla Gee I managed the patient along with the ED Attending      Electronically Signed by         Agustin Guillermo MD  08/02/19 3866

## 2019-08-02 NOTE — DISCHARGE INSTRUCTIONS
Return to the ER for severe worsening of bleeding, worsening lightheadedness, any other new or concerning symptoms  Take medication as prescribed   Follow up with your gynecologist

## 2019-08-02 NOTE — TELEPHONE ENCOUNTER
Patient called having significant cramping, bleeding and clots  Changing 2 overnight pads every hour   Patient aware to ER for evaluation and call for follow up

## 2019-08-05 NOTE — ED ATTENDING ATTESTATION
Veronica Kern DO, saw and evaluated the patient  I have discussed the patient with the resident/non-physician practitioner and agree with the resident's/non-physician practitioner's findings, Plan of Care, and MDM as documented in the resident's/non-physician practitioner's note, except where noted  All available labs and Radiology studies were reviewed  I was present for key portions of any procedure(s) performed by the resident/non-physician practitioner and I was immediately available to provide assistance  At this point I agree with the current assessment done in the Emergency Department  I have conducted an independent evaluation of this patient a history and physical is as follows:    Patient is a 51-year-old  Female, , presenting with heavy vaginal bleeding  Patient states she is on her normal menses cycle but states it is much heavier than normal   She states she is soaking through a pad every hour and is passing small clots  Has noted over last several months her vaginal bleeding has become heavier  She had an episode today where she felt lightheaded and was referred to present to the emergency department by her gyn  Patient states he lightheaded has since resolved  Pelvic exam performed by resident did show pooling of blood within the posterior aspect of the vagina  No lacerations or trauma  Patient with normal hemoglobin of 12 3   8 cm well-circumscribed posterior uterine body/fundus mass causing displacement/distortion of the endometrial stripe most compatible with a submucosal fibroid        Endometrium is thickened and vascular, significance unclear  Follow-up may be based on clinical grounds  Unremarkable ovaries  Ultrasound does reveal a fibroid  Patient was seen by OBGYN who recommends outpatient follow-up and will prescribe tranexamic acid  Return if worsens        Critical Care Time  Procedures

## 2019-08-14 ENCOUNTER — HOSPITAL ENCOUNTER (OUTPATIENT)
Dept: ULTRASOUND IMAGING | Facility: CLINIC | Age: 49
Discharge: HOME | End: 2019-08-14
Payer: COMMERCIAL

## 2019-08-14 ENCOUNTER — HOSPITAL ENCOUNTER (OUTPATIENT)
Dept: MAMMOGRAPHY | Facility: CLINIC | Age: 49
Discharge: HOME | End: 2019-08-14
Payer: COMMERCIAL

## 2019-08-14 DIAGNOSIS — R92.8 ABNORMAL MAMMOGRAM: ICD-10-CM

## 2019-08-14 PROCEDURE — 77065 DX MAMMO INCL CAD UNI: CPT

## 2019-08-14 PROCEDURE — G0279 TOMOSYNTHESIS, MAMMO: HCPCS

## 2019-08-16 ENCOUNTER — HOSPITAL ENCOUNTER (EMERGENCY)
Facility: HOSPITAL | Age: 49
Discharge: HOME | End: 2019-08-16
Attending: EMERGENCY MEDICINE | Admitting: EMERGENCY MEDICINE
Payer: COMMERCIAL

## 2019-08-16 ENCOUNTER — APPOINTMENT (EMERGENCY)
Dept: RADIOLOGY | Facility: HOSPITAL | Age: 49
End: 2019-08-16
Payer: COMMERCIAL

## 2019-08-16 VITALS
HEIGHT: 70 IN | TEMPERATURE: 98.2 F | RESPIRATION RATE: 16 BRPM | SYSTOLIC BLOOD PRESSURE: 131 MMHG | OXYGEN SATURATION: 99 % | WEIGHT: 165 LBS | HEART RATE: 76 BPM | DIASTOLIC BLOOD PRESSURE: 55 MMHG | BODY MASS INDEX: 23.62 KG/M2

## 2019-08-16 DIAGNOSIS — S50.12XA CONTUSION OF LEFT FOREARM, INITIAL ENCOUNTER: Primary | ICD-10-CM

## 2019-08-16 PROCEDURE — 99283 EMERGENCY DEPT VISIT LOW MDM: CPT

## 2019-08-16 PROCEDURE — 99283 EMERGENCY DEPT VISIT LOW MDM: CPT | Performed by: PHYSICIAN ASSISTANT

## 2019-08-16 PROCEDURE — 73090 X-RAY EXAM OF FOREARM: CPT

## 2019-08-16 RX ORDER — IBUPROFEN 400 MG/1
800 TABLET ORAL ONCE
Status: DISCONTINUED | OUTPATIENT
Start: 2019-08-16 | End: 2019-08-16

## 2019-08-17 NOTE — ED PROVIDER NOTES
History  Chief Complaint   Patient presents with    Arm Injury     pt states blocked a ladder from falling with L arm is now having pain tingling forearm to wrist     61-year-old female presents to the emergency department after sustaining an injury to the left forearm  States that earlier she was helping her daughter move when it mattress and had a ladder in the hallway  States the ladder started to fall toward her and she put her arm up to stop it  States that she stop the ladder with her arm but has had some pain and bruising in the area  Has been applying ice without relief of symptoms  Has not taken anything prior to arrival for pain  Does not wish to take any Motrin in the department  Presently describing some numbness and tingling in the arm with radiation to the 4th and 5th digits  No previous injury to this arm  History provided by:  Patient   used: No    Arm Injury   Location:  Arm  Arm location:  L forearm  Injury: no    Pain details:     Quality:  Throbbing and tingling    Radiates to:  L fingers    Severity:  Mild    Onset quality:  Gradual    Timing:  Constant    Progression:  Unable to specify  Handedness:  Right-handed  Dislocation: no    Prior injury to area:  No  Relieved by:  Nothing  Worsened by: Movement  Ineffective treatments: Ice  Associated symptoms: swelling and tingling    Associated symptoms: no back pain, no decreased range of motion, no fatigue, no fever, no muscle weakness, no neck pain, no numbness and no stiffness        Prior to Admission Medications   Prescriptions Last Dose Informant Patient Reported? Taking? Tranexamic Acid 650 MG TABS Not Taking at Unknown time  No No   Sig: Take 2 tablets by mouth 3 (three) times a day as needed (heavy menses)   Patient not taking: Reported on 8/16/2019      Facility-Administered Medications: None       History reviewed  No pertinent past medical history      Past Surgical History:   Procedure Laterality Date    CERVICAL CONE BIOPSY      HERNIA REPAIR         Family History   Problem Relation Age of Onset    Lymphoma Sister 47        non hodgkins    Hypertension Family     Diabetes Family     Heart disease Family     No Known Problems Sister     No Known Problems Sister     No Known Problems Sister      I have reviewed and agree with the history as documented  Social History     Tobacco Use    Smoking status: Former Smoker    Smokeless tobacco: Never Used   Substance Use Topics    Alcohol use: Yes     Alcohol/week: 3 0 standard drinks     Types: 3 Glasses of wine per week     Comment: weekly    Drug use: No        Review of Systems   Constitutional: Negative for chills, fatigue and fever  HENT: Negative for congestion, dental problem and sore throat  Respiratory: Negative for cough  Cardiovascular: Negative for chest pain  Gastrointestinal: Negative for abdominal pain  Musculoskeletal: Negative for back pain, neck pain and stiffness  Arm pain   Skin: Negative for rash and wound  All other systems reviewed and are negative  Physical Exam  Physical Exam   Constitutional: She is oriented to person, place, and time  Vital signs are normal  She appears well-developed and well-nourished  HENT:   Head: Normocephalic and atraumatic  Cardiovascular: Normal rate and regular rhythm  Pulmonary/Chest: Effort normal and breath sounds normal  No respiratory distress  She has no wheezes  She has no rhonchi  She has no rales  Musculoskeletal:        Left forearm: She exhibits tenderness and swelling  She exhibits no bony tenderness, no edema, no deformity and no laceration  Arms:  Neurological: She is alert and oriented to person, place, and time  Skin: Skin is warm and dry  Psychiatric: She has a normal mood and affect  Her behavior is normal    Nursing note and vitals reviewed        Vital Signs  ED Triage Vitals [08/16/19 1700]   Temperature Pulse Respirations Blood Pressure SpO2   98 2 °F (36 8 °C) 76 16 131/55 99 %      Temp Source Heart Rate Source Patient Position - Orthostatic VS BP Location FiO2 (%)   Oral -- -- -- --      Pain Score       5           Vitals:    08/16/19 1700   BP: 131/55   Pulse: 76         Visual Acuity      ED Medications  Medications - No data to display    Diagnostic Studies  Results Reviewed     None                 XR forearm 2 views LEFT   Final Result by Dayton Mayen MD (08/16 1723)      No acute osseous abnormality  Workstation performed: [id number]                    Procedures  Procedures       ED Course                               MDM  Number of Diagnoses or Management Options  Contusion of left forearm, initial encounter:   Diagnosis management comments: Differential diagnosis includes but not limited to:  Contusion, radicular pain, fracture         Amount and/or Complexity of Data Reviewed  Tests in the radiology section of CPT®: ordered and reviewed  Independent visualization of images, tracings, or specimens: yes        Disposition  Final diagnoses:   Contusion of left forearm, initial encounter     Time reflects when diagnosis was documented in both MDM as applicable and the Disposition within this note     Time User Action Codes Description Comment    8/16/2019  5:38 PM Ann Ray Pitch Add [S50 12XA] Contusion of left forearm, initial encounter       ED Disposition     ED Disposition Condition Date/Time Comment    Discharge Stable Fri Aug 16, 2019  5:38 PM Tammy Faulkner discharge to home/self care              Follow-up Information     Follow up With Specialties Details Why Contact Info Additional [address] [facility] Schedule an appointment as soon as possible for a visit  If symptoms worsen [address] [facility], [facility], [address] [place] [address], [phone]          Discharge Medication List as of 8/16/2019  5:38 PM      CONTINUE these medications which have NOT CHANGED    Details   Tranexamic Acid 650 MG TABS Take 2 tablets by mouth 3 (three) times a day as needed (heavy menses), Starting Fri 8/2/2019, Normal           No discharge procedures on file      ED Provider  Electronically Signed by           Donta Kumari PA-C  08/16/19 1931

## 2019-08-27 ENCOUNTER — TELEPHONE (OUTPATIENT)
Dept: OBGYN CLINIC | Facility: CLINIC | Age: 49
End: 2019-08-27

## 2019-08-27 NOTE — TELEPHONE ENCOUNTER
Patient called with concern of family hx of blood clots  and taking Tranexamic acid  Patient remembers Dr Bianca Yi asked that question before prescribing in the ER when the patient was seen  After the fact patient realized multiple family members had  due to strokes on her mother side of the family  Should patient take the medication with this Hx  Patient is scheduled to get her menses at the end of the week  Patient has not taken yet since it was prescribed

## 2019-09-05 ENCOUNTER — OFFICE VISIT (OUTPATIENT)
Dept: OBGYN CLINIC | Facility: CLINIC | Age: 49
End: 2019-09-05
Payer: COMMERCIAL

## 2019-09-05 VITALS
DIASTOLIC BLOOD PRESSURE: 70 MMHG | BODY MASS INDEX: 24.65 KG/M2 | SYSTOLIC BLOOD PRESSURE: 124 MMHG | WEIGHT: 172.2 LBS | HEIGHT: 70 IN

## 2019-09-05 DIAGNOSIS — N92.0 MENORRHAGIA WITH REGULAR CYCLE: Primary | ICD-10-CM

## 2019-09-05 DIAGNOSIS — D21.9 FIBROID: ICD-10-CM

## 2019-09-05 PROBLEM — Z01.419 ENCOUNTER FOR GYNECOLOGICAL EXAMINATION WITHOUT ABNORMAL FINDING: Status: RESOLVED | Noted: 2018-09-26 | Resolved: 2019-09-05

## 2019-09-05 PROBLEM — Z12.4 PAP SMEAR FOR CERVICAL CANCER SCREENING: Status: RESOLVED | Noted: 2018-09-27 | Resolved: 2019-09-05

## 2019-09-05 PROCEDURE — 99213 OFFICE O/P EST LOW 20 MIN: CPT | Performed by: OBSTETRICS & GYNECOLOGY

## 2019-09-05 NOTE — PROGRESS NOTES
Assessment/Plan:    Menorrhagia with fibroid uterus  Currently with some relief using Lysteda  Reviewed options at this time would like to discuss the uterine artery embolization with Radiology and follow-up from there       Problem List Items Addressed This Visit        Other    Menorrhagia with regular cycle - Primary    Fibroid            Subjective:      Patient ID: Angelique Kiser is a 57 y o  female  Murl Jerry is here today to review the finding of a 8 cm fibroid on pelvic ultrasound  She had been having some episodes of menorrhagia and actually was seen in the emergency room due to the heaviness of menses bleeding  She has started on the Lysteda and that has helped this last menses  She also mentioned that she had notice a more palpable lower abdominal mass over the last few months that she had had previously  Reviewed her prior exams and the new onset of fibroid  We reviewed all the options with respect to her fibroid  We discussed  risks and benefits associated with each of the options as well as the possible recovery afterwards  She is interested in trying the uterine artery embolization procedure  We also discussed using Depo Lupron and hysterectomy  We also discussed the size the fibroid how could fact hysterectomy options in the future if it would continue to grow  At this point were going to refer her for the embolization procedure and follow-up from there      The following portions of the patient's history were reviewed and updated as appropriate:   She  has a past medical history of Fibroid (08/06/2019)  She   Patient Active Problem List    Diagnosis Date Noted    Menorrhagia with regular cycle 09/05/2019    Fibroid 09/05/2019     She  has a past surgical history that includes Hernia repair and Cervical cone biopsy  Her family history includes Diabetes in her family; Heart disease in her family; Hypertension in her family; Lymphoma (age of onset: 47) in her sister;  No Known Problems in her sister, sister, and sister  She  reports that she has quit smoking  She has never used smokeless tobacco  She reports that she drinks about 3 0 standard drinks of alcohol per week  She reports that she does not use drugs  Current Outpatient Medications   Medication Sig Dispense Refill    Tranexamic Acid 650 MG TABS Take 2 tablets by mouth 3 (three) times a day as needed (heavy menses) 30 tablet 3     No current facility-administered medications for this visit  Current Outpatient Medications on File Prior to Visit   Medication Sig    Tranexamic Acid 650 MG TABS Take 2 tablets by mouth 3 (three) times a day as needed (heavy menses)     No current facility-administered medications on file prior to visit  She is allergic to penicillins       Review of Systems   Constitutional: Negative for chills, fatigue, fever and unexpected weight change  HENT: Negative for dental problem, sinus pressure and sinus pain  Eyes: Negative for visual disturbance  Respiratory: Negative for cough, shortness of breath and wheezing  Cardiovascular: Negative for chest pain and leg swelling  Gastrointestinal: Negative for constipation, diarrhea, nausea and vomiting  Genitourinary: Negative for urgency  Musculoskeletal: Negative for back pain and joint swelling  Allergic/Immunologic: Negative for environmental allergies  Neurological: Negative for dizziness and headaches  Psychiatric/Behavioral: The patient is not nervous/anxious  Objective:      /70 (BP Location: Left arm, Patient Position: Sitting, Cuff Size: Standard)   Ht 5' 10" (1 778 m)   Wt 78 1 kg (172 lb 3 2 oz)   LMP 08/28/2019 (Exact Date)   BMI 24 71 kg/m²          Physical Exam   Constitutional: She is oriented to person, place, and time  She appears well-developed and well-nourished  No distress  White female    HENT:   Head: Normocephalic and atraumatic     Neurological: She is alert and oriented to person, place, and time    Vitals reviewed

## 2019-09-11 ENCOUNTER — TELEPHONE (OUTPATIENT)
Dept: OBGYN CLINIC | Facility: CLINIC | Age: 49
End: 2019-09-11

## 2019-09-11 DIAGNOSIS — D25.9 UTERINE LEIOMYOMA, UNSPECIFIED LOCATION: Primary | ICD-10-CM

## 2019-09-11 NOTE — TELEPHONE ENCOUNTER
----- Message from Sandra Valencia MD sent at 9/5/2019  3:53 PM EDT -----  Please send appropriate information and referral for a uterine artery embolization for fibroid

## 2019-09-16 ENCOUNTER — TELEPHONE (OUTPATIENT)
Dept: OBGYN CLINIC | Facility: CLINIC | Age: 49
End: 2019-09-16

## 2019-09-16 NOTE — TELEPHONE ENCOUNTER
Left detailed message for patient that she can call [phone] to schedule and is in the computer  Had mailed all information out to patient but likely hasnt received yet   Call back with questions

## 2019-09-16 NOTE — TELEPHONE ENCOUNTER
Following up about a procedure  Patient does not have one scheduled   Wants to know if she needs to do anything on her part to get scheduled

## 2019-09-30 ENCOUNTER — TELEPHONE (OUTPATIENT)
Dept: OBGYN CLINIC | Facility: CLINIC | Age: 49
End: 2019-09-30

## 2019-09-30 NOTE — TELEPHONE ENCOUNTER
The embolization may decrease the fibroid by half the size but could be painful, and it could grow  The only definative removal is with hysterectomy  - and if it grows more it could limit the surgical way to do the hysterectomy    It is up to her -

## 2019-09-30 NOTE — TELEPHONE ENCOUNTER
Patient wants advice from Dr Vonnie Aguillon, considering size of fibroid, would she just recommend having hysterectomy  Patient is concerned that fibroid may not shrink enough and need hysterectomy anyway

## 2019-10-03 ENCOUNTER — OFFICE VISIT (OUTPATIENT)
Dept: OBGYN CLINIC | Facility: CLINIC | Age: 49
End: 2019-10-03
Payer: COMMERCIAL

## 2019-10-03 VITALS
DIASTOLIC BLOOD PRESSURE: 82 MMHG | SYSTOLIC BLOOD PRESSURE: 124 MMHG | BODY MASS INDEX: 24.77 KG/M2 | HEIGHT: 70 IN | WEIGHT: 173 LBS

## 2019-10-03 DIAGNOSIS — D21.9 FIBROID: Primary | ICD-10-CM

## 2019-10-03 DIAGNOSIS — N92.0 MENORRHAGIA WITH REGULAR CYCLE: ICD-10-CM

## 2019-10-03 PROBLEM — D25.9 UTERINE LEIOMYOMA: Status: ACTIVE | Noted: 2019-10-03

## 2019-10-03 PROCEDURE — 99213 OFFICE O/P EST LOW 20 MIN: CPT | Performed by: OBSTETRICS & GYNECOLOGY

## 2019-10-03 NOTE — PROGRESS NOTES
Assessment/Plan:    Fibroid uterus with menorrhagia, patient would like to schedule a definitive surgical procedure  Will schedule laparoscopic-assisted vaginal hysterectomy with bilateral salpingectomy, but patient and her  both aware that it could end up being an open procedure  Problem List Items Addressed This Visit        Other    Menorrhagia with regular cycle    Fibroid - Primary            Subjective:      Patient ID: Montana Arriaga is a 54 y o  female  Verena Ram is here again to review options for treatment of her uterine fibroid  She had gotten the information for the uterine artery embolization and after reviewing it, with her , she feels that it definitive surgical option would be better  We again reviewed the risks and benefits associated with laparoscopic-assisted vaginal hysterectomy with bilateral salpingectomy, particularly the risk of an open procedure secondary to the size of the fibroid and her past surgical history  She is aware of that  We answered all questions  She would like to schedule the procedure  We will see her back for preop visit prior to the procedure  The following portions of the patient's history were reviewed and updated as appropriate:   She  has a past medical history of Fibroid (08/06/2019)  She   Patient Active Problem List    Diagnosis Date Noted    Menorrhagia with regular cycle 09/05/2019    Fibroid 09/05/2019     She  has a past surgical history that includes Hernia repair and Cervical cone biopsy  Her family history includes Diabetes in her family; Heart disease in her family; Hypertension in her family; Lymphoma (age of onset: 47) in her sister; No Known Problems in her sister, sister, and sister  She  reports that she has quit smoking  She has never used smokeless tobacco  She reports that she drinks about 3 0 standard drinks of alcohol per week  She reports that she does not use drugs    Current Outpatient Medications Medication Sig Dispense Refill    Tranexamic Acid 650 MG TABS Take 2 tablets by mouth 3 (three) times a day as needed (heavy menses) 30 tablet 3     No current facility-administered medications for this visit  Current Outpatient Medications on File Prior to Visit   Medication Sig    Tranexamic Acid 650 MG TABS Take 2 tablets by mouth 3 (three) times a day as needed (heavy menses)     No current facility-administered medications on file prior to visit  She is allergic to penicillins       Review of Systems   Constitutional: Negative for chills, fatigue, fever and unexpected weight change  HENT: Negative for dental problem, sinus pressure and sinus pain  Eyes: Negative for visual disturbance  Respiratory: Negative for cough, shortness of breath and wheezing  Cardiovascular: Negative for chest pain and leg swelling  Gastrointestinal: Negative for constipation, diarrhea, nausea and vomiting  Genitourinary: Negative for urgency  Musculoskeletal: Negative for back pain and joint swelling  Allergic/Immunologic: Negative for environmental allergies  Neurological: Negative for dizziness and headaches  Psychiatric/Behavioral: The patient is not nervous/anxious  Objective: There were no vitals taken for this visit  Physical Exam   Constitutional: She is oriented to person, place, and time  She appears well-developed and well-nourished  No distress  Tall young white female   HENT:   Head: Normocephalic and atraumatic  Neurological: She is alert and oriented to person, place, and time  Psychiatric: She has a normal mood and affect

## 2019-10-17 ENCOUNTER — OFFICE VISIT (OUTPATIENT)
Dept: OBGYN CLINIC | Facility: CLINIC | Age: 49
End: 2019-10-17

## 2019-10-17 VITALS
DIASTOLIC BLOOD PRESSURE: 82 MMHG | HEIGHT: 70 IN | BODY MASS INDEX: 24.88 KG/M2 | SYSTOLIC BLOOD PRESSURE: 124 MMHG | WEIGHT: 173.8 LBS

## 2019-10-17 DIAGNOSIS — N92.0 MENORRHAGIA WITH REGULAR CYCLE: ICD-10-CM

## 2019-10-17 LAB
ABO GROUP BLD: NORMAL
ALBUMIN SERPL-MCNC: 4.1 G/DL (ref 3.6–5.1)
ALBUMIN/GLOB SERPL: 1.5 (CALC) (ref 1–2.5)
ALP SERPL-CCNC: 33 U/L (ref 33–115)
ALT SERPL-CCNC: 10 U/L (ref 6–29)
AST SERPL-CCNC: 16 U/L (ref 10–35)
BASOPHILS # BLD AUTO: 69 CELLS/UL (ref 0–200)
BASOPHILS NFR BLD AUTO: 1.4 %
BILIRUB SERPL-MCNC: 0.2 MG/DL (ref 0.2–1.2)
BLD GP AB SCN SERPL QL: NORMAL
BUN SERPL-MCNC: 11 MG/DL (ref 7–25)
BUN/CREAT SERPL: NORMAL (CALC) (ref 6–22)
CALCIUM SERPL-MCNC: 8.8 MG/DL (ref 8.6–10.2)
CHLORIDE SERPL-SCNC: 102 MMOL/L (ref 98–110)
CO2 SERPL-SCNC: 28 MMOL/L (ref 20–32)
CREAT SERPL-MCNC: 0.64 MG/DL (ref 0.5–1.1)
EOSINOPHIL # BLD AUTO: 211 CELLS/UL (ref 15–500)
EOSINOPHIL NFR BLD AUTO: 4.3 %
ERYTHROCYTE [DISTWIDTH] IN BLOOD BY AUTOMATED COUNT: 12 % (ref 11–15)
GLOBULIN SER CALC-MCNC: 2.7 G/DL (CALC) (ref 1.9–3.7)
GLUCOSE SERPL-MCNC: 85 MG/DL (ref 65–99)
HCT VFR BLD AUTO: 39.2 % (ref 35–45)
HGB BLD-MCNC: 12.5 G/DL (ref 11.7–15.5)
LYMPHOCYTES # BLD AUTO: 1597 CELLS/UL (ref 850–3900)
LYMPHOCYTES NFR BLD AUTO: 32.6 %
MCH RBC QN AUTO: 30.3 PG (ref 27–33)
MCHC RBC AUTO-ENTMCNC: 31.9 G/DL (ref 32–36)
MCV RBC AUTO: 95.1 FL (ref 80–100)
MONOCYTES # BLD AUTO: 715 CELLS/UL (ref 200–950)
MONOCYTES NFR BLD AUTO: 14.6 %
NEUTROPHILS # BLD AUTO: 2308 CELLS/UL (ref 1500–7800)
NEUTROPHILS NFR BLD AUTO: 47.1 %
PLATELET # BLD AUTO: 333 THOUSAND/UL (ref 140–400)
PMV BLD REES-ECKER: 10.7 FL (ref 7.5–12.5)
POTASSIUM SERPL-SCNC: 4.4 MMOL/L (ref 3.5–5.3)
PROT SERPL-MCNC: 6.8 G/DL (ref 6.1–8.1)
RBC # BLD AUTO: 4.12 MILLION/UL (ref 3.8–5.1)
RH BLD: NORMAL
SL AMB EGFR AFRICAN AMERICAN: 123 ML/MIN/1.73M2
SL AMB EGFR NON AFRICAN AMERICAN: 106 ML/MIN/1.73M2
SODIUM SERPL-SCNC: 137 MMOL/L (ref 135–146)
T4 FREE SERPL-MCNC: 0.9 NG/DL (ref 0.8–1.8)
TSH SERPL-ACNC: 2.72 MIU/L
WBC # BLD AUTO: 4.9 THOUSAND/UL (ref 3.8–10.8)

## 2019-10-17 PROCEDURE — PREOP: Performed by: OBSTETRICS & GYNECOLOGY

## 2019-10-17 NOTE — H&P (VIEW-ONLY)
Assessment/Plan:    Preop evaluation for a laparoscopic-assisted vaginal hysterectomy with bilateral salpingectomy  The fibroid is fairly significant size and midline  We reviewed with Mariluz Dobson that we will evaluate her for the laparoscopic approach under anesthesia, but to be aware that we may end up having to go for an open procedure secondary to the size the fibroid if we are unable to obtain appropriate visualization  She is aware of this  Consent is reviewed and signed  Problem List Items Addressed This Visit     None            Subjective:      Patient ID: Morgan Mercado is a 54 y o  female  Mariluz Dobson is here today for her preop visit  She is scheduled for a laparoscopic-assisted vaginal hysterectomy with bilateral salpingectomy  She is aware that this could end up being an open procedure due to the size of the uterus  We again reviewed the risks and benefits associated with surgery and anticipated postoperative course  She is hoping to get back to work as soon as possible and we discussed the importance of resting  postop  We reviewed the consent with her and it was signed      The following portions of the patient's history were reviewed and updated as appropriate:   She  has a past medical history of Fibroid (08/06/2019)  She   Patient Active Problem List    Diagnosis Date Noted    Uterine leiomyoma 10/03/2019    Menorrhagia with regular cycle 09/05/2019    Fibroid 09/05/2019     She  has a past surgical history that includes Hernia repair and Cervical cone biopsy  Her family history includes Diabetes in her family; Heart disease in her family; Hypertension in her family; Lymphoma (age of onset: 47) in her sister; No Known Problems in her sister, sister, and sister  She  reports that she has quit smoking  She has never used smokeless tobacco  She reports that she drinks about 3 0 standard drinks of alcohol per week  She reports that she does not use drugs    Current Outpatient Medications Medication Sig Dispense Refill    Tranexamic Acid 650 MG TABS Take 2 tablets by mouth 3 (three) times a day as needed (heavy menses) 30 tablet 3     No current facility-administered medications for this visit  Current Outpatient Medications on File Prior to Visit   Medication Sig    Tranexamic Acid 650 MG TABS Take 2 tablets by mouth 3 (three) times a day as needed (heavy menses)     No current facility-administered medications on file prior to visit  She is allergic to penicillins       Review of Systems   Constitutional: Negative for chills, fatigue, fever and unexpected weight change  HENT: Negative for dental problem, sinus pressure and sinus pain  Eyes: Negative for visual disturbance  Respiratory: Negative for cough, shortness of breath and wheezing  Cardiovascular: Negative for chest pain and leg swelling  Gastrointestinal: Negative for constipation, diarrhea, nausea and vomiting  Genitourinary: Negative for urgency  Musculoskeletal: Negative for back pain and joint swelling  Allergic/Immunologic: Negative for environmental allergies  Neurological: Negative for dizziness and headaches  Psychiatric/Behavioral: The patient is not nervous/anxious  Objective:      LMP 09/25/2019 (Exact Date)     /82 (BP Location: Left arm, Patient Position: Sitting, Cuff Size: Standard)   Ht 5' 10" (1 778 m)   Wt 78 8 kg (173 lb 12 8 oz)   LMP 09/25/2019 (Exact Date)   BMI 24 94 kg/m²        Physical Exam   Constitutional: She is oriented to person, place, and time  She appears well-developed and well-nourished  No distress  Tall young white female   HENT:   Head: Normocephalic and atraumatic  Cardiovascular: Normal rate, regular rhythm and normal heart sounds  No murmur heard  Pulmonary/Chest: Effort normal and breath sounds normal  No respiratory distress  She has no wheezes  Abdominal: Soft  She exhibits no distension  There is no tenderness   There is no rebound and no guarding  Abdomen is soft with well-healed scars and the supraumbilical area from hernia repair, and a Pfannenstiel scar from a prior   The uterus is palpable at about U -2 to 3 and is mobile and nontender   Neurological: She is alert and oriented to person, place, and time  Vitals reviewed

## 2019-10-17 NOTE — PROGRESS NOTES
Assessment/Plan:    Preop evaluation for a laparoscopic-assisted vaginal hysterectomy with bilateral salpingectomy  The fibroid is fairly significant size and midline  We reviewed with Cyndie Shea that we will evaluate her for the laparoscopic approach under anesthesia, but to be aware that we may end up having to go for an open procedure secondary to the size the fibroid if we are unable to obtain appropriate visualization  She is aware of this  Consent is reviewed and signed  Problem List Items Addressed This Visit     None            Subjective:      Patient ID: Zia Maynard is a 54 y o  female  Cyndie Shea is here today for her preop visit  She is scheduled for a laparoscopic-assisted vaginal hysterectomy with bilateral salpingectomy  She is aware that this could end up being an open procedure due to the size of the uterus  We again reviewed the risks and benefits associated with surgery and anticipated postoperative course  She is hoping to get back to work as soon as possible and we discussed the importance of resting  postop  We reviewed the consent with her and it was signed      The following portions of the patient's history were reviewed and updated as appropriate:   She  has a past medical history of Fibroid (08/06/2019)  She   Patient Active Problem List    Diagnosis Date Noted    Uterine leiomyoma 10/03/2019    Menorrhagia with regular cycle 09/05/2019    Fibroid 09/05/2019     She  has a past surgical history that includes Hernia repair and Cervical cone biopsy  Her family history includes Diabetes in her family; Heart disease in her family; Hypertension in her family; Lymphoma (age of onset: 47) in her sister; No Known Problems in her sister, sister, and sister  She  reports that she has quit smoking  She has never used smokeless tobacco  She reports that she drinks about 3 0 standard drinks of alcohol per week  She reports that she does not use drugs    Current Outpatient Medications Patient contacted -- stated feeling better today  Cramping easing and intermittent -- a little uncomfortable this morning but feeling better  Denies low back pain; cramping not localized to one side or the other; and/or bleeding  Has h/o tubal pregnancy about 5 years ago.  Recommended monitoring and if symptoms increase, to contact office  Patient verbalized understanding   Medication Sig Dispense Refill    Tranexamic Acid 650 MG TABS Take 2 tablets by mouth 3 (three) times a day as needed (heavy menses) 30 tablet 3     No current facility-administered medications for this visit  Current Outpatient Medications on File Prior to Visit   Medication Sig    Tranexamic Acid 650 MG TABS Take 2 tablets by mouth 3 (three) times a day as needed (heavy menses)     No current facility-administered medications on file prior to visit  She is allergic to penicillins       Review of Systems   Constitutional: Negative for chills, fatigue, fever and unexpected weight change  HENT: Negative for dental problem, sinus pressure and sinus pain  Eyes: Negative for visual disturbance  Respiratory: Negative for cough, shortness of breath and wheezing  Cardiovascular: Negative for chest pain and leg swelling  Gastrointestinal: Negative for constipation, diarrhea, nausea and vomiting  Genitourinary: Negative for urgency  Musculoskeletal: Negative for back pain and joint swelling  Allergic/Immunologic: Negative for environmental allergies  Neurological: Negative for dizziness and headaches  Psychiatric/Behavioral: The patient is not nervous/anxious  Objective:      LMP 09/25/2019 (Exact Date)     /82 (BP Location: Left arm, Patient Position: Sitting, Cuff Size: Standard)   Ht 5' 10" (1 778 m)   Wt 78 8 kg (173 lb 12 8 oz)   LMP 09/25/2019 (Exact Date)   BMI 24 94 kg/m²        Physical Exam   Constitutional: She is oriented to person, place, and time  She appears well-developed and well-nourished  No distress  Tall young white female   HENT:   Head: Normocephalic and atraumatic  Cardiovascular: Normal rate, regular rhythm and normal heart sounds  No murmur heard  Pulmonary/Chest: Effort normal and breath sounds normal  No respiratory distress  She has no wheezes  Abdominal: Soft  She exhibits no distension  There is no tenderness   There is no rebound and no guarding  Abdomen is soft with well-healed scars and the supraumbilical area from hernia repair, and a Pfannenstiel scar from a prior   The uterus is palpable at about U -2 to 3 and is mobile and nontender   Neurological: She is alert and oriented to person, place, and time  Vitals reviewed

## 2019-11-01 ENCOUNTER — TELEPHONE (OUTPATIENT)
Dept: OBGYN CLINIC | Facility: CLINIC | Age: 49
End: 2019-11-01

## 2019-11-01 NOTE — TELEPHONE ENCOUNTER
Per Automated system, patient active and effective 9/1/18  $15 office copay   $550 deductible, $118 remain

## 2019-11-06 ENCOUNTER — ANESTHESIA EVENT (OUTPATIENT)
Dept: PERIOP | Facility: HOSPITAL | Age: 49
End: 2019-11-06
Payer: COMMERCIAL

## 2019-11-12 ENCOUNTER — APPOINTMENT (OUTPATIENT)
Dept: LAB | Facility: HOSPITAL | Age: 49
End: 2019-11-12
Attending: OBSTETRICS & GYNECOLOGY
Payer: COMMERCIAL

## 2019-11-12 DIAGNOSIS — Z01.818 PRE-OP EVALUATION: ICD-10-CM

## 2019-11-12 DIAGNOSIS — D25.9 UTERINE LEIOMYOMA, UNSPECIFIED LOCATION: ICD-10-CM

## 2019-11-12 DIAGNOSIS — Z01.818 PRE-OP EVALUATION: Primary | ICD-10-CM

## 2019-11-12 LAB
ABO GROUP BLD: NORMAL
ALBUMIN SERPL BCP-MCNC: 3.6 G/DL (ref 3.5–5)
ALP SERPL-CCNC: 36 U/L (ref 46–116)
ALT SERPL W P-5'-P-CCNC: 16 U/L (ref 12–78)
ANION GAP SERPL CALCULATED.3IONS-SCNC: 6 MMOL/L (ref 4–13)
AST SERPL W P-5'-P-CCNC: 16 U/L (ref 5–45)
BASOPHILS # BLD AUTO: 0.07 THOUSANDS/ΜL (ref 0–0.1)
BASOPHILS NFR BLD AUTO: 1 % (ref 0–1)
BILIRUB SERPL-MCNC: 0.34 MG/DL (ref 0.2–1)
BLD GP AB SCN SERPL QL: NEGATIVE
BUN SERPL-MCNC: 11 MG/DL (ref 5–25)
CALCIUM SERPL-MCNC: 9 MG/DL (ref 8.3–10.1)
CHLORIDE SERPL-SCNC: 107 MMOL/L (ref 100–108)
CO2 SERPL-SCNC: 26 MMOL/L (ref 21–32)
CREAT SERPL-MCNC: 0.6 MG/DL (ref 0.6–1.3)
EOSINOPHIL # BLD AUTO: 0.11 THOUSAND/ΜL (ref 0–0.61)
EOSINOPHIL NFR BLD AUTO: 2 % (ref 0–6)
ERYTHROCYTE [DISTWIDTH] IN BLOOD BY AUTOMATED COUNT: 13 % (ref 11.6–15.1)
EST. AVERAGE GLUCOSE BLD GHB EST-MCNC: 88 MG/DL
GFR SERPL CREATININE-BSD FRML MDRD: 109 ML/MIN/1.73SQ M
GLUCOSE P FAST SERPL-MCNC: 91 MG/DL (ref 65–99)
HBA1C MFR BLD: 4.7 % (ref 4.2–6.3)
HCT VFR BLD AUTO: 38.6 % (ref 34.8–46.1)
HGB BLD-MCNC: 12.3 G/DL (ref 11.5–15.4)
IMM GRANULOCYTES # BLD AUTO: 0.01 THOUSAND/UL (ref 0–0.2)
IMM GRANULOCYTES NFR BLD AUTO: 0 % (ref 0–2)
LYMPHOCYTES # BLD AUTO: 1.66 THOUSANDS/ΜL (ref 0.6–4.47)
LYMPHOCYTES NFR BLD AUTO: 30 % (ref 14–44)
MCH RBC QN AUTO: 30.9 PG (ref 26.8–34.3)
MCHC RBC AUTO-ENTMCNC: 31.9 G/DL (ref 31.4–37.4)
MCV RBC AUTO: 97 FL (ref 82–98)
MONOCYTES # BLD AUTO: 0.66 THOUSAND/ΜL (ref 0.17–1.22)
MONOCYTES NFR BLD AUTO: 12 % (ref 4–12)
NEUTROPHILS # BLD AUTO: 2.98 THOUSANDS/ΜL (ref 1.85–7.62)
NEUTS SEG NFR BLD AUTO: 55 % (ref 43–75)
NRBC BLD AUTO-RTO: 0 /100 WBCS
PLATELET # BLD AUTO: 313 THOUSANDS/UL (ref 149–390)
PMV BLD AUTO: 10.6 FL (ref 8.9–12.7)
POTASSIUM SERPL-SCNC: 4 MMOL/L (ref 3.5–5.3)
PROT SERPL-MCNC: 7.1 G/DL (ref 6.4–8.2)
RBC # BLD AUTO: 3.98 MILLION/UL (ref 3.81–5.12)
RH BLD: NEGATIVE
SODIUM SERPL-SCNC: 139 MMOL/L (ref 136–145)
SPECIMEN EXPIRATION DATE: NORMAL
T4 FREE SERPL-MCNC: 0.74 NG/DL (ref 0.76–1.46)
TSH SERPL DL<=0.05 MIU/L-ACNC: 2.13 UIU/ML (ref 0.36–3.74)
WBC # BLD AUTO: 5.49 THOUSAND/UL (ref 4.31–10.16)

## 2019-11-12 PROCEDURE — 80053 COMPREHEN METABOLIC PANEL: CPT

## 2019-11-12 PROCEDURE — 85025 COMPLETE CBC W/AUTO DIFF WBC: CPT

## 2019-11-12 PROCEDURE — 83036 HEMOGLOBIN GLYCOSYLATED A1C: CPT

## 2019-11-12 PROCEDURE — 84443 ASSAY THYROID STIM HORMONE: CPT

## 2019-11-12 PROCEDURE — 84439 ASSAY OF FREE THYROXINE: CPT

## 2019-11-12 PROCEDURE — 86901 BLOOD TYPING SEROLOGIC RH(D): CPT

## 2019-11-12 PROCEDURE — 86900 BLOOD TYPING SEROLOGIC ABO: CPT

## 2019-11-12 PROCEDURE — 86850 RBC ANTIBODY SCREEN: CPT

## 2019-11-12 PROCEDURE — 36415 COLL VENOUS BLD VENIPUNCTURE: CPT

## 2019-11-12 NOTE — PRE-PROCEDURE INSTRUCTIONS
No outpatient medications have been marked as taking for the 11/15/19 encounter [facility] HOSPITAL Encounter)  Reviewed with Pt in [facility] office appointment, hysterectomy class, IS with return demonstration, showering instructions, NPO at MN for DOS, advised of Nasir Daniels 27 call with final DOS insts 11 14 2019  Contact info given for both ALVINO Nolasco RN and Reema Partida RN, who completed class and documemtation with Pt  Pt to have labs drawn today will set a f-up date for RN review  Pt verbalized understanding to all of the above

## 2019-11-14 NOTE — ANESTHESIA PREPROCEDURE EVALUATION
Review of Systems/Medical History  Patient summary reviewed  Chart reviewed  No history of anesthetic complications     Cardiovascular  Negative cardio ROS    Pulmonary  Negative pulmonary ROS Smoker ex-smoker  ,        GI/Hepatic  Negative GI/hepatic ROS          Negative  ROS        Endo/Other  Negative endo/other ROS      GYN  Negative gynecology ROS     Comment: Uterine leiomyoma     Hematology  Negative hematology ROS      Musculoskeletal  Negative musculoskeletal ROS        Neurology  Negative neurology ROS      Psychology   Negative psychology ROS          EKG 6/4/17: NSR, septal infarct pattern    Recent Results (from the past 1008 hour(s))   Comprehensive metabolic panel    Collection Time: 10/16/19  9:17 AM   Result Value Ref Range    Glucose, Random 85 65 - 99 mg/dL    BUN 11 7 - 25 mg/dL    Creatinine 0 64 0 50 - 1 10 mg/dL    eGFR Non  106 > OR = 60 mL/min/1 73m2    eGFR  123 > OR = 60 mL/min/1 73m2    SL AMB BUN/CREATININE RATIO NOT APPLICABLE 6 - 22 (calc)    Sodium 137 135 - 146 mmol/L    Potassium 4 4 3 5 - 5 3 mmol/L    Chloride 102 98 - 110 mmol/L    CO2 28 20 - 32 mmol/L    SL AMB CALCIUM 8 8 8 6 - 10 2 mg/dL    Protein, Total 6 8 6 1 - 8 1 g/dL    Albumin 4 1 3 6 - 5 1 g/dL    Globulin 2 7 1 9 - 3 7 g/dL (calc)    Albumin/Globulin Ratio 1 5 1 0 - 2 5 (calc)    TOTAL BILIRUBIN 0 2 0 2 - 1 2 mg/dL    Alkaline Phosphatase 33 33 - 115 U/L    AST 16 10 - 35 U/L    ALT 10 6 - 29 U/L   CBC and differential    Collection Time: 10/16/19  9:17 AM   Result Value Ref Range    White Blood Cell Count 4 9 3 8 - 10 8 Thousand/uL    Red Blood Cell Count 4 12 3 80 - 5 10 Million/uL    Hemoglobin 12 5 11 7 - 15 5 g/dL    HCT 39 2 35 0 - 45 0 %    MCV 95 1 80 0 - 100 0 fL    MCH 30 3 27 0 - 33 0 pg    MCHC 31 9 (L) 32 0 - 36 0 g/dL    RDW 12 0 11 0 - 15 0 %    Platelet Count 284 860 - 400 Thousand/uL    SL AMB MPV 10 7 7 5 - 12 5 fL    Neutrophils (Absolute) 2,308 1,500 - 7,800 cells/uL    Lymphocytes (Absolute) 1,597 850 - 3,900 cells/uL    Monocytes (Absolute) 715 200 - 950 cells/uL    Eosinophils (Absolute) 211 15 - 500 cells/uL    Basophils ABS 69 0 - 200 cells/uL    Neutrophils 47 1 %    Lymphocytes 32 6 %    Monocytes 14 6 %    Eosinophils 4 3 %    Basophils PCT 1 4 %   T4, free    Collection Time: 10/16/19  9:17 AM   Result Value Ref Range    Free t4 0 9 0 8 - 1 8 ng/dL   TSH, 3rd generation    Collection Time: 10/16/19  9:17 AM   Result Value Ref Range    TSH 2 72 mIU/L   Antibody screen    Collection Time: 10/16/19  9:17 AM   Result Value Ref Range    SL AMB ANTIBODY SCREEN, RBC W/REFL ID, TITER AND AG NO ANTIBODIES DETECTED    ABO/Rh    Collection Time: 10/16/19  9:17 AM   Result Value Ref Range    ABO Grouping O     Rh Type RH(D) NEGATIVE    Type and screen    Collection Time: 11/12/19 10:05 AM   Result Value Ref Range    ABO Grouping O     Rh Factor Negative     Specimen Expiration Date [id number]    Comprehensive metabolic panel    Collection Time: 11/12/19 10:05 AM   Result Value Ref Range    Sodium 139 136 - 145 mmol/L    Potassium 4 0 3 5 - 5 3 mmol/L    Chloride 107 100 - 108 mmol/L    CO2 26 21 - 32 mmol/L    ANION GAP 6 4 - 13 mmol/L    BUN 11 5 - 25 mg/dL    Creatinine 0 60 0 60 - 1 30 mg/dL    Glucose, Fasting 91 65 - 99 mg/dL    Calcium 9 0 8 3 - 10 1 mg/dL    AST 16 5 - 45 U/L    ALT 16 12 - 78 U/L    Alkaline Phosphatase 36 (L) 46 - 116 U/L    Total Protein 7 1 6 4 - 8 2 g/dL    Albumin 3 6 3 5 - 5 0 g/dL    Total Bilirubin 0 34 0 20 - 1 00 mg/dL    eGFR 109 ml/min/1 73sq m   CBC and differential    Collection Time: 11/12/19 10:05 AM   Result Value Ref Range    WBC 5 49 4 31 - 10 16 Thousand/uL    RBC 3 98 3 81 - 5 12 Million/uL    Hemoglobin 12 3 11 5 - 15 4 g/dL    Hematocrit 38 6 34 8 - 46 1 %    MCV 97 82 - 98 fL    MCH 30 9 26 8 - 34 3 pg    MCHC 31 9 31 4 - 37 4 g/dL    RDW 13 0 11 6 - 15 1 %    MPV 10 6 8 9 - 12 7 fL    Platelets 642 042 - 940 Thousands/uL nRBC 0 /100 WBCs    Neutrophils Relative 55 43 - 75 %    Immat GRANS % 0 0 - 2 %    Lymphocytes Relative 30 14 - 44 %    Monocytes Relative 12 4 - 12 %    Eosinophils Relative 2 0 - 6 %    Basophils Relative 1 0 - 1 %    Neutrophils Absolute 2 98 1 85 - 7 62 Thousands/µL    Immature Grans Absolute 0 01 0 00 - 0 20 Thousand/uL    Lymphocytes Absolute 1 66 0 60 - 4 47 Thousands/µL    Monocytes Absolute 0 66 0 17 - 1 22 Thousand/µL    Eosinophils Absolute 0 11 0 00 - 0 61 Thousand/µL    Basophils Absolute 0 07 0 00 - 0 10 Thousands/µL   TSH    Collection Time: 11/12/19 10:05 AM   Result Value Ref Range    TSH 3RD GENERATON 2 130 0 358 - 3 740 uIU/mL   T4, free    Collection Time: 11/12/19 10:05 AM   Result Value Ref Range    Free T4 0 74 (L) 0 76 - 1 46 ng/dL   Hemoglobin A1C    Collection Time: 11/12/19 10:05 AM   Result Value Ref Range    Hemoglobin A1C 4 7 4 2 - 6 3 %    EAG 88 mg/dl   Antibody screen    Collection Time: 11/12/19 10:05 AM   Result Value Ref Range    Antibody Screen Negative        Physical Exam    Airway    Mallampati score: I         Dental   No notable dental hx     Cardiovascular  Comment: Negative ROS, Rhythm: regular, Rate: normal,     Pulmonary  Breath sounds clear to auscultation,     Other Findings        Anesthesia Plan  ASA Score- 2     Anesthesia Type- general with ASA Monitors  Additional Monitors:   Airway Plan: ETT  Plan Factors-    Induction- intravenous  Postoperative Plan- Plan for postoperative opioid use  Planned trial extubation    Informed Consent- Anesthetic plan and risks discussed with patient  I personally reviewed this patient with the CRNA  Discussed and agreed on the Anesthesia Plan with the CRNA  Krystle Bro

## 2019-11-15 ENCOUNTER — ANESTHESIA (OUTPATIENT)
Dept: PERIOP | Facility: HOSPITAL | Age: 49
End: 2019-11-15
Payer: COMMERCIAL

## 2019-11-15 ENCOUNTER — HOSPITAL ENCOUNTER (OUTPATIENT)
Facility: HOSPITAL | Age: 49
Setting detail: OUTPATIENT SURGERY
Discharge: HOME | End: 2019-11-15
Attending: OBSTETRICS & GYNECOLOGY | Admitting: OBSTETRICS & GYNECOLOGY
Payer: COMMERCIAL

## 2019-11-15 VITALS
RESPIRATION RATE: 16 BRPM | BODY MASS INDEX: 24.77 KG/M2 | OXYGEN SATURATION: 97 % | DIASTOLIC BLOOD PRESSURE: 73 MMHG | WEIGHT: 173 LBS | HEIGHT: 70 IN | HEART RATE: 86 BPM | TEMPERATURE: 99.2 F | SYSTOLIC BLOOD PRESSURE: 118 MMHG

## 2019-11-15 DIAGNOSIS — Z91.89 AT RISK FOR OBSTRUCTIVE SLEEP APNEA: Primary | ICD-10-CM

## 2019-11-15 DIAGNOSIS — Z90.710 S/P LAPAROSCOPIC ASSISTED VAGINAL HYSTERECTOMY (LAVH): ICD-10-CM

## 2019-11-15 DIAGNOSIS — D25.9 UTERINE LEIOMYOMA, UNSPECIFIED LOCATION: ICD-10-CM

## 2019-11-15 LAB
EXT PREGNANCY TEST URINE: NEGATIVE
EXT. CONTROL: NORMAL

## 2019-11-15 PROCEDURE — 81025 URINE PREGNANCY TEST: CPT | Performed by: OBSTETRICS & GYNECOLOGY

## 2019-11-15 PROCEDURE — 88307 TISSUE EXAM BY PATHOLOGIST: CPT | Performed by: PATHOLOGY

## 2019-11-15 PROCEDURE — 58554 LAPARO-VAG HYST W/T/O COMPL: CPT | Performed by: OBSTETRICS & GYNECOLOGY

## 2019-11-15 RX ORDER — ALBUMIN, HUMAN INJ 5% 5 %
SOLUTION INTRAVENOUS CONTINUOUS PRN
Status: DISCONTINUED | OUTPATIENT
Start: 2019-11-15 | End: 2019-11-15 | Stop reason: SURG

## 2019-11-15 RX ORDER — LIDOCAINE HYDROCHLORIDE 10 MG/ML
INJECTION, SOLUTION INFILTRATION; PERINEURAL AS NEEDED
Status: DISCONTINUED | OUTPATIENT
Start: 2019-11-15 | End: 2019-11-15 | Stop reason: SURG

## 2019-11-15 RX ORDER — ONDANSETRON 2 MG/ML
4 INJECTION INTRAMUSCULAR; INTRAVENOUS EVERY 6 HOURS PRN
Status: DISCONTINUED | OUTPATIENT
Start: 2019-11-15 | End: 2019-11-15 | Stop reason: HOSPADM

## 2019-11-15 RX ORDER — ACETAMINOPHEN 325 MG/1
975 TABLET ORAL ONCE
Status: COMPLETED | OUTPATIENT
Start: 2019-11-15 | End: 2019-11-15

## 2019-11-15 RX ORDER — ROCURONIUM BROMIDE 10 MG/ML
INJECTION, SOLUTION INTRAVENOUS AS NEEDED
Status: DISCONTINUED | OUTPATIENT
Start: 2019-11-15 | End: 2019-11-15 | Stop reason: SURG

## 2019-11-15 RX ORDER — SODIUM CHLORIDE, SODIUM LACTATE, POTASSIUM CHLORIDE, CALCIUM CHLORIDE 600; 310; 30; 20 MG/100ML; MG/100ML; MG/100ML; MG/100ML
125 INJECTION, SOLUTION INTRAVENOUS CONTINUOUS
Status: DISCONTINUED | OUTPATIENT
Start: 2019-11-15 | End: 2019-11-15 | Stop reason: HOSPADM

## 2019-11-15 RX ORDER — GABAPENTIN 100 MG/1
200 CAPSULE ORAL ONCE
Status: COMPLETED | OUTPATIENT
Start: 2019-11-15 | End: 2019-11-15

## 2019-11-15 RX ORDER — IBUPROFEN 200 MG
400 TABLET ORAL EVERY 6 HOURS SCHEDULED
Qty: 12 TABLET | Refills: 0 | Status: SHIPPED | OUTPATIENT
Start: 2019-11-15 | End: 2019-11-15 | Stop reason: SDUPTHER

## 2019-11-15 RX ORDER — BUPIVACAINE HYDROCHLORIDE 2.5 MG/ML
INJECTION, SOLUTION INFILTRATION; PERINEURAL AS NEEDED
Status: DISCONTINUED | OUTPATIENT
Start: 2019-11-15 | End: 2019-11-15 | Stop reason: HOSPADM

## 2019-11-15 RX ORDER — GLYCOPYRROLATE 0.2 MG/ML
INJECTION INTRAMUSCULAR; INTRAVENOUS AS NEEDED
Status: DISCONTINUED | OUTPATIENT
Start: 2019-11-15 | End: 2019-11-15 | Stop reason: SURG

## 2019-11-15 RX ORDER — ONDANSETRON 2 MG/ML
4 INJECTION INTRAMUSCULAR; INTRAVENOUS ONCE AS NEEDED
Status: DISCONTINUED | OUTPATIENT
Start: 2019-11-15 | End: 2019-11-15 | Stop reason: HOSPADM

## 2019-11-15 RX ORDER — KETAMINE HYDROCHLORIDE 50 MG/ML
INJECTION, SOLUTION, CONCENTRATE INTRAMUSCULAR; INTRAVENOUS AS NEEDED
Status: DISCONTINUED | OUTPATIENT
Start: 2019-11-15 | End: 2019-11-15 | Stop reason: SURG

## 2019-11-15 RX ORDER — HYDROMORPHONE HCL/PF 1 MG/ML
0.5 SYRINGE (ML) INJECTION
Status: DISCONTINUED | OUTPATIENT
Start: 2019-11-15 | End: 2019-11-15 | Stop reason: HOSPADM

## 2019-11-15 RX ORDER — OXYCODONE HYDROCHLORIDE 5 MG/1
5 TABLET ORAL EVERY 4 HOURS PRN
Status: DISCONTINUED | OUTPATIENT
Start: 2019-11-15 | End: 2019-11-15 | Stop reason: HOSPADM

## 2019-11-15 RX ORDER — HYDROMORPHONE HCL/PF 1 MG/ML
SYRINGE (ML) INJECTION AS NEEDED
Status: DISCONTINUED | OUTPATIENT
Start: 2019-11-15 | End: 2019-11-15 | Stop reason: SURG

## 2019-11-15 RX ORDER — SODIUM CHLORIDE 9 MG/ML
INJECTION, SOLUTION INTRAVENOUS CONTINUOUS PRN
Status: DISCONTINUED | OUTPATIENT
Start: 2019-11-15 | End: 2019-11-15 | Stop reason: SURG

## 2019-11-15 RX ORDER — OXYCODONE HYDROCHLORIDE 10 MG/1
10 TABLET ORAL EVERY 4 HOURS PRN
Status: DISCONTINUED | OUTPATIENT
Start: 2019-11-15 | End: 2019-11-15 | Stop reason: HOSPADM

## 2019-11-15 RX ORDER — ONDANSETRON 2 MG/ML
INJECTION INTRAMUSCULAR; INTRAVENOUS AS NEEDED
Status: DISCONTINUED | OUTPATIENT
Start: 2019-11-15 | End: 2019-11-15 | Stop reason: SURG

## 2019-11-15 RX ORDER — IBUPROFEN 200 MG
400 TABLET ORAL EVERY 6 HOURS SCHEDULED
Qty: 12 TABLET | Refills: 0 | Status: SHIPPED | OUTPATIENT
Start: 2019-11-15 | End: 2019-12-21 | Stop reason: ALTCHOICE

## 2019-11-15 RX ORDER — DEXAMETHASONE SODIUM PHOSPHATE 10 MG/ML
INJECTION, SOLUTION INTRAMUSCULAR; INTRAVENOUS AS NEEDED
Status: DISCONTINUED | OUTPATIENT
Start: 2019-11-15 | End: 2019-11-15 | Stop reason: SURG

## 2019-11-15 RX ORDER — FENTANYL CITRATE 50 UG/ML
INJECTION, SOLUTION INTRAMUSCULAR; INTRAVENOUS AS NEEDED
Status: DISCONTINUED | OUTPATIENT
Start: 2019-11-15 | End: 2019-11-15 | Stop reason: SURG

## 2019-11-15 RX ORDER — CEFAZOLIN SODIUM 2 G/50ML
2000 SOLUTION INTRAVENOUS ONCE
Status: COMPLETED | OUTPATIENT
Start: 2019-11-15 | End: 2019-11-15

## 2019-11-15 RX ORDER — MIDAZOLAM HYDROCHLORIDE 2 MG/2ML
INJECTION, SOLUTION INTRAMUSCULAR; INTRAVENOUS AS NEEDED
Status: DISCONTINUED | OUTPATIENT
Start: 2019-11-15 | End: 2019-11-15 | Stop reason: SURG

## 2019-11-15 RX ORDER — OXYCODONE HYDROCHLORIDE AND ACETAMINOPHEN 5; 325 MG/1; MG/1
1 TABLET ORAL EVERY 4 HOURS PRN
Qty: 12 TABLET | Refills: 0 | Status: SHIPPED | OUTPATIENT
Start: 2019-11-15 | End: 2019-11-15 | Stop reason: SDUPTHER

## 2019-11-15 RX ORDER — OXYCODONE HYDROCHLORIDE AND ACETAMINOPHEN 5; 325 MG/1; MG/1
1 TABLET ORAL EVERY 4 HOURS PRN
Qty: 12 TABLET | Refills: 0 | Status: SHIPPED | OUTPATIENT
Start: 2019-11-15 | End: 2019-12-21 | Stop reason: ALTCHOICE

## 2019-11-15 RX ORDER — FENTANYL CITRATE/PF 50 MCG/ML
25 SYRINGE (ML) INJECTION
Status: DISCONTINUED | OUTPATIENT
Start: 2019-11-15 | End: 2019-11-15 | Stop reason: HOSPADM

## 2019-11-15 RX ORDER — PROPOFOL 10 MG/ML
INJECTION, EMULSION INTRAVENOUS AS NEEDED
Status: DISCONTINUED | OUTPATIENT
Start: 2019-11-15 | End: 2019-11-15 | Stop reason: SURG

## 2019-11-15 RX ORDER — IBUPROFEN 600 MG/1
600 TABLET ORAL EVERY 6 HOURS PRN
Status: DISCONTINUED | OUTPATIENT
Start: 2019-11-15 | End: 2019-11-15 | Stop reason: HOSPADM

## 2019-11-15 RX ADMIN — ONDANSETRON 4 MG: 2 INJECTION INTRAMUSCULAR; INTRAVENOUS at 08:02

## 2019-11-15 RX ADMIN — ALBUMIN (HUMAN): 12.5 SOLUTION INTRAVENOUS at 10:03

## 2019-11-15 RX ADMIN — ROCURONIUM BROMIDE 20 MG: 50 INJECTION, SOLUTION INTRAVENOUS at 09:10

## 2019-11-15 RX ADMIN — HYDROMORPHONE HYDROCHLORIDE 0.25 MG: 1 INJECTION, SOLUTION INTRAMUSCULAR; INTRAVENOUS; SUBCUTANEOUS at 09:12

## 2019-11-15 RX ADMIN — PROPOFOL 150 MG: 10 INJECTION, EMULSION INTRAVENOUS at 08:02

## 2019-11-15 RX ADMIN — FENTANYL CITRATE 100 MCG: 50 INJECTION, SOLUTION INTRAMUSCULAR; INTRAVENOUS at 08:02

## 2019-11-15 RX ADMIN — FENTANYL CITRATE 25 MCG: 50 INJECTION, SOLUTION INTRAMUSCULAR; INTRAVENOUS at 10:40

## 2019-11-15 RX ADMIN — ROCURONIUM BROMIDE 50 MG: 50 INJECTION, SOLUTION INTRAVENOUS at 08:02

## 2019-11-15 RX ADMIN — PHENYLEPHRINE HYDROCHLORIDE 100 MCG: 10 INJECTION INTRAVENOUS at 08:21

## 2019-11-15 RX ADMIN — GLYCOPYRROLATE 0.2 MG: 0.2 INJECTION, SOLUTION INTRAMUSCULAR; INTRAVENOUS at 10:13

## 2019-11-15 RX ADMIN — SODIUM CHLORIDE: 0.9 INJECTION, SOLUTION INTRAVENOUS at 10:15

## 2019-11-15 RX ADMIN — GLYCOPYRROLATE 0.2 MG: 0.2 INJECTION, SOLUTION INTRAMUSCULAR; INTRAVENOUS at 08:40

## 2019-11-15 RX ADMIN — PHENYLEPHRINE HYDROCHLORIDE 100 MCG: 10 INJECTION INTRAVENOUS at 09:19

## 2019-11-15 RX ADMIN — PHENYLEPHRINE HYDROCHLORIDE 100 MCG: 10 INJECTION INTRAVENOUS at 08:33

## 2019-11-15 RX ADMIN — ONDANSETRON 4 MG: 2 INJECTION INTRAMUSCULAR; INTRAVENOUS at 10:33

## 2019-11-15 RX ADMIN — PROPOFOL 50 MG: 10 INJECTION, EMULSION INTRAVENOUS at 08:07

## 2019-11-15 RX ADMIN — HYDROMORPHONE HYDROCHLORIDE 0.25 MG: 1 INJECTION, SOLUTION INTRAMUSCULAR; INTRAVENOUS; SUBCUTANEOUS at 09:30

## 2019-11-15 RX ADMIN — CEFAZOLIN SODIUM 2000 MG: 2 SOLUTION INTRAVENOUS at 08:12

## 2019-11-15 RX ADMIN — PHENYLEPHRINE HYDROCHLORIDE 100 MCG: 10 INJECTION INTRAVENOUS at 08:54

## 2019-11-15 RX ADMIN — KETAMINE HYDROCHLORIDE 30 MG: 50 INJECTION, SOLUTION INTRAMUSCULAR; INTRAVENOUS at 08:02

## 2019-11-15 RX ADMIN — SODIUM CHLORIDE: 0.9 INJECTION, SOLUTION INTRAVENOUS at 08:15

## 2019-11-15 RX ADMIN — PHENYLEPHRINE HYDROCHLORIDE 10 MCG/MIN: 10 INJECTION INTRAVENOUS at 09:57

## 2019-11-15 RX ADMIN — LIDOCAINE HYDROCHLORIDE 50 MG: 10 INJECTION, SOLUTION INFILTRATION; PERINEURAL at 08:02

## 2019-11-15 RX ADMIN — METHYLENE BLUE 2 ML: 5 INJECTION INTRAVENOUS at 08:55

## 2019-11-15 RX ADMIN — ACETAMINOPHEN 975 MG: 325 TABLET ORAL at 07:21

## 2019-11-15 RX ADMIN — ROCURONIUM BROMIDE 20 MG: 50 INJECTION, SOLUTION INTRAVENOUS at 09:32

## 2019-11-15 RX ADMIN — PHENYLEPHRINE HYDROCHLORIDE 100 MCG: 10 INJECTION INTRAVENOUS at 08:43

## 2019-11-15 RX ADMIN — ROCURONIUM BROMIDE 20 MG: 50 INJECTION, SOLUTION INTRAVENOUS at 08:45

## 2019-11-15 RX ADMIN — ROCURONIUM BROMIDE 20 MG: 50 INJECTION, SOLUTION INTRAVENOUS at 08:28

## 2019-11-15 RX ADMIN — SODIUM CHLORIDE, SODIUM LACTATE, POTASSIUM CHLORIDE, AND CALCIUM CHLORIDE: .6; .31; .03; .02 INJECTION, SOLUTION INTRAVENOUS at 07:56

## 2019-11-15 RX ADMIN — MIDAZOLAM 2 MG: 1 INJECTION INTRAMUSCULAR; INTRAVENOUS at 07:59

## 2019-11-15 RX ADMIN — SODIUM CHLORIDE, SODIUM LACTATE, POTASSIUM CHLORIDE, AND CALCIUM CHLORIDE 125 ML/HR: .6; .31; .03; .02 INJECTION, SOLUTION INTRAVENOUS at 11:41

## 2019-11-15 RX ADMIN — ROCURONIUM BROMIDE 20 MG: 50 INJECTION, SOLUTION INTRAVENOUS at 10:15

## 2019-11-15 RX ADMIN — SUGAMMADEX 157 MG: 100 INJECTION, SOLUTION INTRAVENOUS at 10:36

## 2019-11-15 RX ADMIN — DEXAMETHASONE SODIUM PHOSPHATE 10 MG: 10 INJECTION, SOLUTION INTRAMUSCULAR; INTRAVENOUS at 08:02

## 2019-11-15 RX ADMIN — ALBUMIN (HUMAN): 12.5 SOLUTION INTRAVENOUS at 08:34

## 2019-11-15 RX ADMIN — GABAPENTIN 200 MG: 100 CAPSULE ORAL at 07:20

## 2019-11-15 NOTE — ANESTHESIA POSTPROCEDURE EVALUATION
Post-Op Assessment Note    CV Status:  Stable  Pain Score: 0    Pain management: adequate     Mental Status:  Alert and awake   Hydration Status:  Euvolemic   PONV Controlled:  Controlled   Airway Patency:  Patent   Post Op Vitals Reviewed: Yes      Staff: CRNA           BP   131/69   Temp   97 1   Pulse  100   Resp   16   SpO2   100%

## 2019-11-15 NOTE — INTERVAL H&P NOTE
H&P reviewed  After examining the patient I find no changes in the patients condition since the H&P had been written      Vitals:    11/15/19 0702   BP: 123/70   Pulse: 76   Resp: 20   Temp: 98 9 °F (37 2 °C)   SpO2: 97%

## 2019-11-15 NOTE — OP NOTE
OPERATIVE REPORT  PATIENT NAME: Montana Arriaga    :  1970  MRN: [id number]  Pt Location: [facility] OR ROOM 03    SURGERY DATE: 11/15/2019    Surgeon(s) and Role:     * Pennie Casas MD - Primary     * Georges Mccoy MD - Assisting    Preop Diagnosis:  Uterine leiomyoma, unspecified location [D25 9]    Post-Op Diagnosis Codes:     * Uterine leiomyoma, unspecified location [D25 9]    Procedure(s) (LRB):  HYSTERECTOMY LAPAROSCOPIC ASSISTED VAGINAL (LAVH) (N/A)  SALPINGECTOMY, LAPAROSCOPIC (Bilateral)    Specimen(s):  ID Type Source Tests Collected by Time Destination   1 : uterus, cervix, bilateral tubes and fibroids Tissue Uterus TISSUE EXAM Pennie Casas MD 11/15/2019 5683        Estimated Blood Loss:   600 mL    Fluids:  1850cc    Drains:  [REMOVED] Urethral Catheter Non-latex 16 Fr  (Removed)   Number of days: 0       Anesthesia Type:   General    Operative Indications:  Uterine leiomyoma, unspecified location [D25 9]      Operative Findings:  Small cervix without lesions  Large fibroid uterus sounded to 15 cm  Normal appearing bilateral fallopian tubes and ovaries    Complications:   None    Procedure and Technique:  The patient was taken to the operating room and placed in supine position on OR table  She was given prophylactic intravenous antibiotics (Ancef 2g)  General anesthesia was established without difficulty  The patient was then positioned on the operating table in the dorsal lithotomy position with the legs supported using stirrups  All pressure points were padded and a Hermilo hugger was placed to maintain control of core body temperature  The patient was then prepped and draped in the usual sterile fashion  A time out was performed to confirm correct patient and correct procedure  An exam under anesthesia was performed  A weighted speculum was placed into the vagina, and using a Katrin retractor, the anterior portion of the cervix was visualized and grasped with a double tooth tenaculum   The uterus sounded to 15cm  The tenaculum was secured to a coronado dilator for uterine manipulation  A mckinley catheter was then inserted into the bladder  Surgeons gloves were then changed, and attention was turned to the abdomen  A 10mml incision was then made at wagner's point, and a 10mm trocar and sleeve were inserted through the incision into the peritoneum under direct visualization  Laparoscopic visualization confirmed the intraperitoneal insertion of the port  Pneumoperitoneum was established, then maintained using carbon dioxide  Two additional small incisions were made in both the right and left lower quadrants, approximately 3 cm above and 2 cm medial to the anterior superior iliac spine  Two ports 5mm (left), 5mm (right) were introduced under direct visualization  The patient was placed in trendelenburg, the entire pelvis was inspected  The upper abdomen was visualized and appeared normal   Posterior cul de sac and ovarian fossa inspected and normal  There was minimal adhesive disease  Attention was then turned to the uterus, which was deviated over to the left to expose the left adnexa  The ureter was visualized  The fallopian tube was grasped at the fimbriated end, the mesosalpinx was exposed and cut and coagulated using the Enseal device  The uteroovarian ligament was then grasped, coagulated and cut  Once  from the ovary, the round ligament was then coagulated and cut to the level of the uterine arteries  A bladder flap was then created and dissected approximately half of the way across the uterus at the level of the lower uterine segment  The bladder was pushed down  The Enseal device was then used to coagulate the uterine artery  The same steps were taken on the contralateral side down to the level of the right uterine artery  The bladder flap was completed  Attention was then turned back to the perineum   A weighted speculum was inserted into the vagina, the uterine manipulator was removed  and using a Wendel retractor, the anterior and posterior portions of the cervix was visualized and grasped with two double-tooth tenaculum  A scalpel was used to make a circumferential incision at the cervicovaginal junction  The vagina was bluntly dissected off of the cervix using a 4x4 gauze  The posterior cul-de-sac was entered sharply  The posterior peritoneum was secured to the posterior mucosa using a figure-of-eight stitch  The gooseneck weighted speculum was inserted into the posterior cul-de-sac  Using the EnSeal Super jaw, the uterosacral ligaments were coagulated and cut  This was repeated along the cardinal ligaments bilaterally ensuring coagulation of the uterine arteries  The uterus was delivered vaginally  All of the pedicles were visualized, and excellent hemostasis was noted  Using a free needle, the suture end of the uterosacral stitch was passed through the needle and secure to the lateral angles of the vaginal cuff, then to the posterior cuff and tied down  This was done bilaterally  0 Vicryl was used to suture the cuff and a figure 8 fashion  The cuff was hemostatic  Vaginal instruments were removed  Sterile gloves were exchanged and we returned to the abdomen  Pneumoperitoneum was reestablished and inspection of the abdomen revealed excellent hemostasis along the adnexal pedicles, uterine artery pedicles and vaginal cuff  We irrigated with normal saline and surgicel was placed  Pneumoperitoneum was allowed to escape  The skin incisions were re-approximated with 4-0 Vicryl suture in a subcuticular stitch  Histoacryl was applied to the skin incisions  Rosales catheter was removed  She was placed in the supine position and awakened from general anesthesia without difficulty  She tolerated the procedure well and was transferred to the recovery room in stable condition  All needle, sponge, and instrument counts were noted to be correct at the end of the procedure     Basilio Child was present and participated in the entire procedure         Patient Disposition:  PACU     SIGNATURE: Naveen Miles MD  DATE: November 15, 2019  TIME: 11:07 AM

## 2019-11-15 NOTE — DISCHARGE INSTRUCTIONS
Laparoscopically Assisted Vaginal Hysterectomy   WHAT YOU SHOULD KNOW:   Laparoscopically assisted vaginal hysterectomy (LAVH) is surgery to remove your uterus  Other organs, such as your ovaries and fallopian tubes, may also be removed  AFTER YOU LEAVE:   Medicines:   · NSAIDs  help decrease swelling, pain, and fever  This medicine is available with or without a doctor's order  NSAIDs can cause stomach bleeding or kidney problems in certain people  If you take blood thinner medicine, always ask your primary healthcare provider (PHP) if NSAIDs are safe for you  Always read the medicine label and follow directions  · Acetaminophen  decreases pain and fever  It is available without a doctor's order  Ask how much to take and how often to take it  Follow directions  Acetaminophen can cause liver damage if not taken correctly  · Prescription pain medicine  may be given  Ask your PHP how to take this medicine safely  · Take your medicine as directed  Contact your PHP if you think your medicine is not helping or if you have side effects  Tell him if you are allergic to any medicine  Keep a list of the medicines, vitamins, and herbs you take  Include the amounts, and when and why you take them  Bring the list or the pill bottles to follow-up visits  Carry your medicine list with you in case of an emergency  Follow up with your PHP or gynecologist as directed: You may need to return for more tests  Write down your questions so you remember to ask them during your visits  Rest as needed: You may feel like resting more after surgery  Slowly start to do more each day  Ask when you can return to your usual activities  Contact your PHP or gynecologist if:   · You have heavy vaginal bleeding that fills 1 or more sanitary pads in 1 hour  · You have a fever  · You have new or increasing bright red blood coming from your vagina or your incisions      · You have trouble urinating, burning when you urinate, or feel a need to urinate often  · You have trouble having a bowel movement  · You have yellow, green, or foul-smelling discharge coming from your vagina  · Your incision is red, swollen, or has pus or foul-smelling drainage coming from it  · You have pain that gets worse instead of better, or that is not controlled with your pain medicine  · Your skin is itchy, swollen, or has a rash  · You have questions or concerns about your condition or care  Seek care immediately or call 911 if:   · Your arm or leg feels warm, tender, and painful  It may look swollen and red  · You feel lightheaded, short of breath, and have chest pain  · You cough up blood

## 2019-11-22 ENCOUNTER — OFFICE VISIT (OUTPATIENT)
Dept: OBGYN CLINIC | Facility: CLINIC | Age: 49
End: 2019-11-22

## 2019-11-22 VITALS
BODY MASS INDEX: 24.18 KG/M2 | DIASTOLIC BLOOD PRESSURE: 72 MMHG | SYSTOLIC BLOOD PRESSURE: 116 MMHG | HEIGHT: 70 IN | WEIGHT: 168.9 LBS

## 2019-11-22 DIAGNOSIS — Z90.710 S/P LAPAROSCOPIC ASSISTED VAGINAL HYSTERECTOMY (LAVH): Primary | ICD-10-CM

## 2019-11-22 DIAGNOSIS — Z48.816 AFTERCARE FOLLOWING SURGERY OF THE GENITOURINARY SYSTEM: ICD-10-CM

## 2019-11-22 PROBLEM — N92.0 MENORRHAGIA WITH REGULAR CYCLE: Status: RESOLVED | Noted: 2019-09-05 | Resolved: 2019-11-22

## 2019-11-22 PROBLEM — D21.9 FIBROID: Status: RESOLVED | Noted: 2019-09-05 | Resolved: 2019-11-22

## 2019-11-22 PROCEDURE — 99024 POSTOP FOLLOW-UP VISIT: CPT | Performed by: OBSTETRICS & GYNECOLOGY

## 2019-11-22 NOTE — PROGRESS NOTES
Assessment/Plan:    Normal postop recovery status post LAVH-BS  Desires to return to work and clear to do so  Continue to increase activity as tolerated but maintain pelvic rest  Return to office 2-3 weeks for further postop surveillance       Problem List Items Addressed This Visit        Other    S/P laparoscopic assisted vaginal hysterectomy (LAVH) - Primary    Aftercare following surgery of the genitourinary system            Subjective:      Patient ID: Humberto Guerra is a 54 y o  female  Tex Echevarria is here for postop visit - 1 week s/p LAVH BS - doing well -  No fevers / chills, no menopausal complaints - no vaginal bleeding  - Good return of bowels and bladder - resuming normal activity and desires return to work -  Reviewed pictures of uterus but pathology still pending -       The following portions of the patient's history were reviewed and updated as appropriate: allergies, current medications, past family history, past medical history, past social history, past surgical history and problem list     Review of Systems   Constitutional: Negative for chills, fatigue, fever and unexpected weight change  HENT: Negative for dental problem, sinus pressure and sinus pain  Eyes: Negative for visual disturbance  Respiratory: Negative for cough, shortness of breath and wheezing  Cardiovascular: Negative for chest pain and leg swelling  Gastrointestinal: Negative for constipation, diarrhea, nausea and vomiting  Genitourinary: Negative for urgency  Musculoskeletal: Negative for back pain and joint swelling  Allergic/Immunologic: Negative for environmental allergies  Neurological: Negative for dizziness and headaches  Psychiatric/Behavioral: The patient is not nervous/anxious  Objective: There were no vitals taken for this visit  Physical Exam   Constitutional: She is oriented to person, place, and time  She appears well-developed and well-nourished  No distress     Tall lean white female    Abdominal: Soft  She exhibits no distension and no mass  There is no tenderness  There is no rebound and no guarding  Small incisions all healing well   Neurological: She is alert and oriented to person, place, and time  Psychiatric: She has a normal mood and affect  Vitals reviewed

## 2019-12-21 ENCOUNTER — OFFICE VISIT (OUTPATIENT)
Dept: OBGYN CLINIC | Facility: CLINIC | Age: 49
End: 2019-12-21

## 2019-12-21 VITALS
DIASTOLIC BLOOD PRESSURE: 90 MMHG | WEIGHT: 173 LBS | BODY MASS INDEX: 24.77 KG/M2 | HEIGHT: 70 IN | SYSTOLIC BLOOD PRESSURE: 140 MMHG

## 2019-12-21 DIAGNOSIS — Z48.816 AFTERCARE FOLLOWING SURGERY OF THE GENITOURINARY SYSTEM: Primary | ICD-10-CM

## 2019-12-21 DIAGNOSIS — Z90.710 S/P LAPAROSCOPIC ASSISTED VAGINAL HYSTERECTOMY (LAVH): ICD-10-CM

## 2019-12-21 PROCEDURE — 99024 POSTOP FOLLOW-UP VISIT: CPT | Performed by: OBSTETRICS & GYNECOLOGY

## 2019-12-21 NOTE — PROGRESS NOTES
Assessment/Plan:    Normal postoperative check  Status post LAVH BS  Return to normal activity except for pelvic rest for an additional 1-2 weeks  Return to office for annual exam, earlier as needed  Problem List Items Addressed This Visit        Other    S/P laparoscopic assisted vaginal hysterectomy (LAVH)    Aftercare following surgery of the genitourinary system - Primary            Subjective:      Patient ID: Alejandra Dai is a 54 y o  female  Keysha Troncoso is here today for a postop check  She is approximately 6 weeks out from an LAVH-BS for large fibroid uterus  She is doing very well  She has resumed fairly normal activity and regular work activity  She denies any problems with bowel or bladder  She has had no menopausal complaints  She has had no vaginal bleeding or discharge  The following portions of the patient's history were reviewed and updated as appropriate: allergies, current medications, past family history, past medical history, past social history, past surgical history and problem list     Review of Systems   Constitutional: Negative for fatigue, fever and unexpected weight change  HENT: Negative for dental problem, sinus pressure and sinus pain  Eyes: Negative for visual disturbance  Respiratory: Negative for cough, shortness of breath and wheezing  Cardiovascular: Negative for chest pain and leg swelling  Gastrointestinal: Negative for blood in stool, constipation, diarrhea, nausea and vomiting  Endocrine: Negative for cold intolerance, heat intolerance and polydipsia  Genitourinary: Negative for dysuria, frequency, hematuria, menstrual problem and pelvic pain  Musculoskeletal: Negative for arthralgias and back pain  Neurological: Negative for dizziness, seizures and headaches  Psychiatric/Behavioral: The patient is not nervous/anxious  Objective:    /90   Ht 5' 10" (1 778 m)   Wt 78 5 kg (173 lb)   Breastfeeding?  No   BMI 24 82 kg/m² Physical Exam   Constitutional: She is oriented to person, place, and time  She appears well-developed and well-nourished  No distress  tall young white female   HENT:   Head: Normocephalic and atraumatic  Abdominal: Soft  She exhibits no distension  There is no tenderness  There is no guarding  Small well-healed scars   Genitourinary:   Genitourinary Comments: Normal female, no vulvar or vaginal lesions are noted there is still some healing at the top of the vaginal cuff, there is no suture material visible but there is some small erythema upon wiping with a procto swab  Cervix and uterus are surgically absent  Bimanual exam reveals no palpable masses and is nontender  Neurological: She is alert and oriented to person, place, and time  Psychiatric: She has a normal mood and affect  Vitals reviewed

## 2020-08-14 ENCOUNTER — TELEPHONE (OUTPATIENT)
Dept: OBGYN CLINIC | Facility: CLINIC | Age: 50
End: 2020-08-14

## 2020-08-14 DIAGNOSIS — Z12.31 ENCOUNTER FOR SCREENING MAMMOGRAM FOR BREAST CANCER: Primary | ICD-10-CM

## 2020-08-14 NOTE — TELEPHONE ENCOUNTER
Patient last annual was 9/2018  Had jeovany last year and note states RTO for annual  I would think she would still need annual physical even if no pap  mammo script entered into patient chart

## 2020-09-10 ENCOUNTER — ANNUAL EXAM (OUTPATIENT)
Dept: OBGYN CLINIC | Facility: CLINIC | Age: 50
End: 2020-09-10
Payer: COMMERCIAL

## 2020-09-10 VITALS
TEMPERATURE: 97.6 F | BODY MASS INDEX: 25.27 KG/M2 | SYSTOLIC BLOOD PRESSURE: 144 MMHG | HEIGHT: 70 IN | DIASTOLIC BLOOD PRESSURE: 86 MMHG | WEIGHT: 176.5 LBS

## 2020-09-10 DIAGNOSIS — Z12.39 SCREENING FOR MALIGNANT NEOPLASM OF BREAST: ICD-10-CM

## 2020-09-10 DIAGNOSIS — Z12.4 PAP SMEAR FOR CERVICAL CANCER SCREENING: ICD-10-CM

## 2020-09-10 DIAGNOSIS — Z01.419 WOMEN'S ANNUAL ROUTINE GYNECOLOGICAL EXAMINATION: Primary | ICD-10-CM

## 2020-09-10 PROBLEM — Z90.710 S/P LAPAROSCOPIC ASSISTED VAGINAL HYSTERECTOMY (LAVH): Status: RESOLVED | Noted: 2019-11-15 | Resolved: 2020-09-10

## 2020-09-10 PROBLEM — Z48.816 AFTERCARE FOLLOWING SURGERY OF THE GENITOURINARY SYSTEM: Status: RESOLVED | Noted: 2019-11-22 | Resolved: 2020-09-10

## 2020-09-10 PROCEDURE — S0612 ANNUAL GYNECOLOGICAL EXAMINA: HCPCS | Performed by: OBSTETRICS & GYNECOLOGY

## 2020-09-10 PROCEDURE — G0145 SCR C/V CYTO,THINLAYER,RESCR: HCPCS | Performed by: OBSTETRICS & GYNECOLOGY

## 2020-09-10 PROCEDURE — 87624 HPV HI-RISK TYP POOLED RSLT: CPT | Performed by: OBSTETRICS & GYNECOLOGY

## 2020-09-10 NOTE — PROGRESS NOTES
Assessment/Plan:    Normal Annual Gyn Exam  Pap smear done of vaginal cuff  Given slip for mammogram  Encouraged to schedule colonoscopy - has referral from PCP  RTO 1 year       Problem List Items Addressed This Visit        Other    Women's annual routine gynecological examination - Primary      Other Visit Diagnoses     Pap smear for cervical cancer screening        Screening for malignant neoplasm of breast        Relevant Orders    Mammo screening bilateral w cad            Subjective:      Patient ID: Lino Varma is a 54 y o  female  Here for annual gyn exam - overall well - no vaginal bleeding or discharge - no menopausal complaints - bowels and bladder normal - has mammogram scheduled and aware needs colonoscopy -       The following portions of the patient's history were reviewed and updated as appropriate:   She  has a past medical history of Fibroid (2019)  She   Patient Active Problem List    Diagnosis Date Noted    Women's annual routine gynecological examination 09/10/2020     She  has a past surgical history that includes Hernia repair; Cervical cone biopsy;  section; pr lap,vag hyst,uterus 250gms/<,salp-ooph (N/A, 11/15/2019); and pr lap,rmv  adnexal structure (Bilateral, 11/15/2019)  Her family history includes Diabetes in her family; Heart disease in her family; Hypertension in her family; Lymphoma (age of onset: 47) in her sister; No Known Problems in her sister, sister, and sister  She  reports that she has quit smoking  She has never used smokeless tobacco  She reports current alcohol use of about 3 0 standard drinks of alcohol per week  She reports that she does not use drugs  No current outpatient medications on file  No current facility-administered medications for this visit  No current outpatient medications on file prior to visit  No current facility-administered medications on file prior to visit  She is allergic to penicillins       Review of Systems   Constitutional: Negative for chills, fatigue, fever and unexpected weight change  HENT: Negative for dental problem, sinus pressure and sinus pain  Eyes: Negative for visual disturbance  Respiratory: Negative for cough, shortness of breath and wheezing  Cardiovascular: Negative for chest pain and leg swelling  Gastrointestinal: Negative for constipation, diarrhea, nausea and vomiting  Genitourinary: Negative for urgency  Musculoskeletal: Negative for back pain and joint swelling  Allergic/Immunologic: Negative for environmental allergies  Neurological: Negative for dizziness and headaches  Psychiatric/Behavioral: The patient is not nervous/anxious  Objective:      /86 (BP Location: Left arm, Patient Position: Sitting, Cuff Size: Standard)   Temp 97 6 °F (36 4 °C)   Ht 5' 10" (1 778 m)   Wt 80 1 kg (176 lb 8 oz)   LMP  (Approximate)   BMI 25 33 kg/m²          Physical Exam  Vitals signs reviewed  Constitutional:       General: She is not in acute distress  Appearance: Normal appearance  She is well-developed and normal weight  Comments: Well nourished white female    HENT:      Head: Normocephalic and atraumatic  Neck:      Musculoskeletal: Normal range of motion and neck supple  Thyroid: No thyromegaly  Cardiovascular:      Rate and Rhythm: Normal rate and regular rhythm  Pulmonary:      Effort: Pulmonary effort is normal  No respiratory distress  Breath sounds: Normal breath sounds  No wheezing or rales  Chest:      Chest wall: No tenderness  Breasts: Breasts are symmetrical          Right: No inverted nipple, mass, nipple discharge, skin change or tenderness  Left: No inverted nipple, mass, nipple discharge, skin change or tenderness  Abdominal:      General: There is no distension  Palpations: Abdomen is soft  There is no mass  Tenderness: There is no abdominal tenderness  There is no guarding or rebound  Genitourinary:     Comments: Normal female, no vulvar or vaginal lesions, Colton's and Bartholin glands are grossly normal   Urethra is in the midline and normal appearance  Pap smear is taken of vaginal cuff   Cervix and Uterus are surgically absent There are no adnexal masses  The exam is nontender  Rectal exam reveals normal sphincter tone, no palpable masses and stool is guaiac negative  Neurological:      Mental Status: She is alert and oriented to person, place, and time     Psychiatric:         Behavior: Behavior normal

## 2020-09-10 NOTE — PATIENT INSTRUCTIONS

## 2020-09-12 LAB
HPV HR 12 DNA CVX QL NAA+PROBE: NEGATIVE
HPV16 DNA CVX QL NAA+PROBE: NEGATIVE
HPV18 DNA CVX QL NAA+PROBE: NEGATIVE

## 2020-09-16 LAB
LAB AP GYN PRIMARY INTERPRETATION: NORMAL
Lab: NORMAL

## 2020-10-19 ENCOUNTER — HOSPITAL ENCOUNTER (OUTPATIENT)
Dept: MAMMOGRAPHY | Facility: CLINIC | Age: 50
Discharge: HOME | End: 2020-10-19
Payer: COMMERCIAL

## 2020-10-19 DIAGNOSIS — Z12.31 ENCOUNTER FOR SCREENING MAMMOGRAM FOR BREAST CANCER: ICD-10-CM

## 2020-10-19 PROCEDURE — 77063 BREAST TOMOSYNTHESIS BI: CPT

## 2020-10-19 PROCEDURE — 77067 SCR MAMMO BI INCL CAD: CPT

## 2020-10-26 DIAGNOSIS — R92.8 ABNORMAL MAMMOGRAM: Primary | ICD-10-CM

## 2020-11-11 ENCOUNTER — HOSPITAL ENCOUNTER (OUTPATIENT)
Dept: RADIOLOGY | Facility: HOSPITAL | Age: 50
Discharge: HOME | End: 2020-11-11
Attending: OBSTETRICS & GYNECOLOGY
Payer: COMMERCIAL

## 2020-11-11 DIAGNOSIS — R92.8 ABNORMAL MAMMOGRAM: ICD-10-CM

## 2020-11-11 PROCEDURE — 76642 ULTRASOUND BREAST LIMITED: CPT

## 2021-04-07 ENCOUNTER — CLINICAL SUPPORT (OUTPATIENT)
Dept: NEPHROLOGY | Facility: CLINIC | Age: 51
End: 2021-04-07

## 2021-04-07 VITALS
HEIGHT: 70 IN | HEART RATE: 79 BPM | WEIGHT: 173.8 LBS | DIASTOLIC BLOOD PRESSURE: 89 MMHG | SYSTOLIC BLOOD PRESSURE: 136 MMHG | BODY MASS INDEX: 24.88 KG/M2

## 2021-04-07 DIAGNOSIS — I10 WHITE COAT SYNDROME WITH HYPERTENSION: Primary | ICD-10-CM

## 2021-04-07 PROCEDURE — PBNCHG PB NO CHARGE PLACEHOLDER

## 2021-04-07 NOTE — PATIENT INSTRUCTIONS
Patient/parent/guardian briefed on responsibility form for safe keeping of ABMP machine and equipment  Patient/parent/guardian signed responsibility form  Patient/parent/guardian briefed on instructions for ABMP use and BP log use and documentation  Patient/parent/guardian verbally acknowledged understanding all instructions

## 2021-04-08 ENCOUNTER — CLINICAL SUPPORT (OUTPATIENT)
Dept: NEPHROLOGY | Facility: CLINIC | Age: 51
End: 2021-04-08
Payer: COMMERCIAL

## 2021-04-08 DIAGNOSIS — I10 WHITE COAT SYNDROME WITH HYPERTENSION: Primary | ICD-10-CM

## 2021-04-08 PROCEDURE — 93784 AMBL BP MNTR W/SOFTWARE: CPT | Performed by: INTERNAL MEDICINE

## 2021-04-09 DIAGNOSIS — Z23 ENCOUNTER FOR IMMUNIZATION: ICD-10-CM

## 2021-04-09 NOTE — PATIENT INSTRUCTIONS
24 HR ABPM returned in working condition with all equipment  Patient had no additional questions or concerns

## 2021-04-09 NOTE — PROGRESS NOTES
I have had the pleasure of interpreting a 24 hour ambulatory blood pressure monitor report performed on Marleny Kahn from April 7, 2021 to April 8, 2021  There were 58 out of 62 successful readings obtained during that time  Of note, the patient had no obvious symptoms listed on her diary  The results were as follows: This was an optimal study due to adequate time of monitoring  [employer] average blood pressure reading was 118/78 with a heart rate of 70  The range was a minimum of 86/49 mm Hg and a maximum of 146/98 mm Hg  The Daytime average blood pressure reading was 123/83 mm Hg with a heart rate of 72, with 09 53% of the systolic readings greater than 135 mm Hg and 60 55% of the diastolic readings greater than 85 mm Hg  There was a insignificant  systolic daytime blood pressure load and significant diastolic daytime blood pressure load  (>20% is significant)    The Nighttime average blood pressure reading was 105/65 with a heart rate of 65, with 8 83% of the systolic readings greater than 120 mm Hg and 49 54% of the diastolic readings greater than 70 mm Hg  There was insignificant  systolic night time blood pressure load, significant diastolic night time blood pressure load  (>20% is significant)     The Mean Arterial Blood Pressure (MABP) nocturnal dipping was 19 87%  Impression:    [employer] 24 hour ambulatory blood pressure monitor average reading was 118/78 mm Hg, with a daytime average blood pressure reading of 123/83 mm Hg and a night time average blood pressure reading of 105/65 mm Hg  The MABP nocturnal dipping was 19 87%    Whether your patient has hypertension requiring treatment or not should be individualized accordingly to the risk versus benefits of the treatment    The definition of hypertension can be found from multiple sources, with the more recent ACC/AHA guidelines from 2017 providing an update on blood pressure thresholds from prior guidelines  White coat hypertension should be considered in the setting of consistently elevated office blood pressure readings and normotensive ABPM parameters  Failure of the blood pressure to dip by at least 10% is called Non-dipping  Independent of hypertension, non-dipping is associated with end organ damage, and in some studies, progression of chronic kidney disease and cardiovascular events  Non-dippers should have an evaluation, in the appropriate setting, for underlying comorbidities (for example, sleep apnea and chronic kidney disease)  Recommendations: For those patients meeting hypertension criteria, further workup for end organ damage and secondary causes should be considered, as appropriate, in the overall evaluation and treatment of the patient  The decision to treat the patient with lifestyle modifications vs anti-hypertensive medications is based on the patient's ASCVD (Atherosclerotic Cardiovascular Disease) risk score, age and co-mordibities,  and thus must be individualized to the patient by the provider as per the AHA/ACC 2017 Guidelines  Please contact our Nephrology team if you need assistance in the management of the patient  Thank you for allowing me to participate in the care of your patient  If you have any questions or concerns, please do not hesitate to contact my office

## 2021-10-06 ENCOUNTER — TELEPHONE (OUTPATIENT)
Dept: OBGYN CLINIC | Facility: CLINIC | Age: 51
End: 2021-10-06

## 2021-10-06 DIAGNOSIS — Z12.31 BREAST CANCER SCREENING BY MAMMOGRAM: Primary | ICD-10-CM

## 2021-11-22 ENCOUNTER — HOSPITAL ENCOUNTER (OUTPATIENT)
Dept: MAMMOGRAPHY | Facility: MEDICAL CENTER | Age: 51
Discharge: HOME | End: 2021-11-22
Payer: COMMERCIAL

## 2021-11-22 VITALS — BODY MASS INDEX: 24.87 KG/M2 | HEIGHT: 70 IN | WEIGHT: 173.72 LBS

## 2021-11-22 DIAGNOSIS — Z12.31 BREAST CANCER SCREENING BY MAMMOGRAM: ICD-10-CM

## 2021-11-22 PROCEDURE — 77067 SCR MAMMO BI INCL CAD: CPT

## 2021-11-22 PROCEDURE — 77063 BREAST TOMOSYNTHESIS BI: CPT

## 2021-12-17 ENCOUNTER — HOSPITAL ENCOUNTER (OUTPATIENT)
Dept: MAMMOGRAPHY | Facility: CLINIC | Age: 51
Discharge: HOME | End: 2021-12-17
Payer: COMMERCIAL

## 2021-12-17 ENCOUNTER — TELEPHONE (OUTPATIENT)
Dept: OBGYN CLINIC | Facility: CLINIC | Age: 51
End: 2021-12-17

## 2021-12-17 ENCOUNTER — HOSPITAL ENCOUNTER (OUTPATIENT)
Dept: ULTRASOUND IMAGING | Facility: CLINIC | Age: 51
Discharge: HOME | End: 2021-12-17
Payer: COMMERCIAL

## 2021-12-17 VITALS — BODY MASS INDEX: 24.77 KG/M2 | HEIGHT: 70 IN | WEIGHT: 173 LBS

## 2021-12-17 DIAGNOSIS — R92.8 ABNORMAL MAMMOGRAM: ICD-10-CM

## 2021-12-17 PROCEDURE — G0279 TOMOSYNTHESIS, MAMMO: HCPCS

## 2021-12-17 PROCEDURE — 77065 DX MAMMO INCL CAD UNI: CPT

## 2021-12-17 PROCEDURE — 76642 ULTRASOUND BREAST LIMITED: CPT

## 2021-12-22 ENCOUNTER — NURSE TRIAGE (OUTPATIENT)
Dept: OTHER | Facility: OTHER | Age: 51
End: 2021-12-22

## 2021-12-22 DIAGNOSIS — Z20.828 SARS-ASSOCIATED CORONAVIRUS EXPOSURE: Primary | ICD-10-CM

## 2021-12-23 PROCEDURE — 87636 SARSCOV2 & INF A&B AMP PRB: CPT | Performed by: FAMILY MEDICINE

## 2021-12-29 ENCOUNTER — TELEPHONE (OUTPATIENT)
Dept: HEMATOLOGY ONCOLOGY | Facility: CLINIC | Age: 51
End: 2021-12-29

## 2021-12-30 PROBLEM — N63.20 LEFT BREAST MASS: Status: ACTIVE | Noted: 2021-12-30

## 2021-12-30 PROBLEM — R92.8 ABNORMAL MAMMOGRAM: Status: ACTIVE | Noted: 2021-12-30

## 2022-01-03 ENCOUNTER — CONSULT (OUTPATIENT)
Dept: SURGICAL ONCOLOGY | Facility: CLINIC | Age: 52
End: 2022-01-03
Payer: COMMERCIAL

## 2022-01-03 VITALS
DIASTOLIC BLOOD PRESSURE: 90 MMHG | RESPIRATION RATE: 14 BRPM | BODY MASS INDEX: 24.62 KG/M2 | HEIGHT: 70 IN | SYSTOLIC BLOOD PRESSURE: 132 MMHG | WEIGHT: 172 LBS | TEMPERATURE: 98.5 F | OXYGEN SATURATION: 99 % | HEART RATE: 90 BPM

## 2022-01-03 DIAGNOSIS — N63.20 LEFT BREAST MASS: Primary | ICD-10-CM

## 2022-01-03 DIAGNOSIS — R92.8 ABNORMAL MAMMOGRAM: ICD-10-CM

## 2022-01-03 DIAGNOSIS — N60.12 FIBROCYSTIC BREAST CHANGES OF BOTH BREASTS: ICD-10-CM

## 2022-01-03 DIAGNOSIS — N60.11 FIBROCYSTIC BREAST CHANGES OF BOTH BREASTS: ICD-10-CM

## 2022-01-03 PROCEDURE — 99243 OFF/OP CNSLTJ NEW/EST LOW 30: CPT | Performed by: SURGERY

## 2022-01-03 NOTE — PROGRESS NOTES
Surgical Oncology Consult Note       [facility] [address] [facility] [place] [id number]  Nataliya Mao Devendra  1970  [id number]      Chief Complaint   Patient presents with    Consult     breast mass         Assessment/Plan    1  Left breast mass    2  Abnormal mammogram         Oncology History    No history exists  This is a 57-year-old female with left breast abnormal mammogram and ultrasound with retro areola asymmetry and left breast upper outer quadrant 15 mm oval mass  She has been going for routine mammogram annually  Most recent mammogram was read as 0 followed by repeat diagnostic mammogram and ultrasound  Radiologist has recommended bilateral breast MRI with and without contrast to delineate these asymmetry  MRI has been scheduled for January 20th  She has been referred to Surgical Oncology is for consultation  She denies of any breast pain nipple discharge nipple retraction or skin changes  Review of Systems   Constitutional: Negative for chills and fever  HENT: Negative for ear pain and sore throat  Eyes: Negative for pain and visual disturbance  Respiratory: Negative for cough and shortness of breath  Cardiovascular: Negative for chest pain and palpitations  Gastrointestinal: Negative for abdominal pain and vomiting  Genitourinary: Negative for dysuria and hematuria  Musculoskeletal: Negative for arthralgias and back pain  Skin: Negative for color change and rash  Neurological: Negative for seizures and syncope  All other systems reviewed and are negative         Past Medical History:      Patient Active Problem List   Diagnosis    Women's annual routine gynecological examination    Left breast mass    Abnormal mammogram        Past Medical History:   Diagnosis Date    Fibroid 08/06/2019        Past Surgical History:   Procedure Laterality Date    CERVICAL CONE BIOPSY       SECTION      HERNIA REPAIR      HYSTERECTOMY  2019    MO LAP,RMV  ADNEXAL STRUCTURE Bilateral 11/15/2019    Procedure: SALPINGECTOMY, LAPAROSCOPIC;  Surgeon: Kevin Shaw MD;  [facility];  Service: Gynecology    MO LAP,VAG HYST,UTERUS 250GMS/<,SALP-OOPH N/A 11/15/2019    Procedure: HYSTERECTOMY LAPAROSCOPIC ASSISTED VAGINAL (LAVH); Surgeon: Kevin Shaw MD;  [facility];  Service: Gynecology        Family History   Problem Relation Age of Onset    No Known Problems Mother     No Known Problems Father     Lymphoma Sister 47        non hodgkins    No Known Problems Sister     No Known Problems Maternal Grandmother     No Known Problems Maternal Grandfather     No Known Problems Paternal Grandmother     No Known Problems Paternal Grandfather     No Known Problems Daughter     Stroke Maternal Aunt     Stroke Maternal Aunt     Stroke Maternal Aunt     No Known Problems Son     No Known Problems Maternal Uncle     No Known Problems Maternal Uncle     BRCA2 Positive Neg Hx     BRCA2 Negative Neg Hx     BRCA1 Positive Neg Hx     BRCA1 Negative Neg Hx     BRCA 1/2 Neg Hx     Ovarian cancer Neg Hx     Endometrial cancer Neg Hx     Colon cancer Neg Hx     Breast cancer additional onset Neg Hx     Breast cancer Neg Hx         Social History     Socioeconomic History    Marital status: /Civil Union     Spouse name: Not on file    Number of children: Not on file    Years of education: Not on file    Highest education level: Not on file   Occupational History    Not on file   Tobacco Use    Smoking status: Former Smoker    Smokeless tobacco: Never Used   Vaping Use    Vaping Use: Never used   Substance and Sexual Activity    Alcohol use:  Yes     Alcohol/week: 3 0 standard drinks     Types: 3 Glasses of wine per week     Comment: social    Drug use: No    Sexual activity: Yes     Partners: Male     Comment: declines std testing   Other Topics Concern    Not on file   Social History Narrative    Not on file     Social Determinants of Health     Financial Resource Strain: Not on file   Food Insecurity: Not on file   Transportation Needs: Not on file   Physical Activity: Not on file   Stress: Not on file   Social Connections: Not on file   Intimate Partner Violence: Not on file   Housing Stability: Not on file      No current outpatient medications on file  Allergies   Allergen Reactions    Penicillins Rash       Physical Exam:     Vitals:    01/03/22 1349   BP: 132/90   Pulse: 90   Resp: 14   Temp: 98 5 °F (36 9 °C)   SpO2: 99%     Physical Exam  Constitutional:       Appearance: Normal appearance  HENT:      Head: Normocephalic and atraumatic  Nose: Nose normal       Mouth/Throat:      Mouth: Mucous membranes are moist    Eyes:      Pupils: Pupils are equal, round, and reactive to light  Cardiovascular:      Rate and Rhythm: Normal rate  Pulses: Normal pulses  Heart sounds: Normal heart sounds  Pulmonary:      Effort: Pulmonary effort is normal       Breath sounds: Normal breath sounds  Chest:      Comments: The bilateral Breast(s) were examined  There was not any sign of an inverted nipple, mass, nipple discharge, skin changes or tenderness  The bilateral  breast(s) were examined in the sitting and supine position  There are not any worrisome skin changes, tenderness, nipple changes, swelling ,bleeding or evidence of mass/s in all four quadrants  Trevor survey demonstrated that there is not any evidence of any clinically suspicious axillary, pectoral or supraclavicular lymph nodes  her breasts are extremely dense consistent with fibrocystic breast and physical examination is limited  Abdominal:      General: Bowel sounds are normal       Palpations: Abdomen is soft  Musculoskeletal:         General: Normal range of motion  Cervical back: Normal range of motion and neck supple  Skin:     General: Skin is warm  Neurological:      General: No focal deficit present  Mental Status: She is alert and oriented to person, place, and time  Psychiatric:         Mood and Affect: Mood normal          Behavior: Behavior normal          Thought Content: Thought content normal          Judgment: Judgment normal          Results:   Mammogram and ultrasound films were personally reviewed by myself  Reports were reviewed    Discussion/Summary:   She has heterogeneously dense breast with consistent fibrocystic breast   Especially given her left breast asymmetry retro areola region highly recommend to obtain a bilateral breast MRI with and without contrast   This has been even recommended by radiologist   This was emphasized to the patient and we will see her after the bilateral breast MRI  All patient's questions were answered to her satisfaction  Advance Care Planning/Advance Directives: Verena Dumont MD discussed the disease status, and treatment plans with Nazanin Pedroza today 01/03/22 and will follow-up with the patient

## 2022-01-20 ENCOUNTER — HOSPITAL ENCOUNTER (OUTPATIENT)
Dept: MRI IMAGING | Facility: HOSPITAL | Age: 52
Discharge: HOME | End: 2022-01-20
Payer: COMMERCIAL

## 2022-01-20 VITALS — WEIGHT: 171.96 LBS | HEIGHT: 70 IN | BODY MASS INDEX: 24.62 KG/M2

## 2022-01-20 DIAGNOSIS — R92.8 ABNORMAL MAMMOGRAM OF LEFT BREAST: ICD-10-CM

## 2022-01-20 DIAGNOSIS — N64.89 BREAST ASYMMETRY: ICD-10-CM

## 2022-01-20 DIAGNOSIS — N63.20 LEFT BREAST MASS: ICD-10-CM

## 2022-01-20 DIAGNOSIS — N60.02 BREAST CYST, LEFT: ICD-10-CM

## 2022-01-20 PROCEDURE — A9585 GADOBUTROL INJECTION: HCPCS | Performed by: OBSTETRICS & GYNECOLOGY

## 2022-01-20 PROCEDURE — C8908 MRI W/O FOL W/CONT, BREAST,: HCPCS

## 2022-01-20 PROCEDURE — 77049 MRI BREAST C-+ W/CAD BI: CPT

## 2022-01-20 PROCEDURE — G1004 CDSM NDSC: HCPCS

## 2022-01-20 RX ADMIN — GADOBUTROL 7 ML: 604.72 INJECTION INTRAVENOUS at 09:37

## 2022-01-26 PROBLEM — N60.11 FIBROCYSTIC BREAST CHANGES OF BOTH BREASTS: Status: ACTIVE | Noted: 2022-01-26

## 2022-01-26 PROBLEM — N60.12 FIBROCYSTIC BREAST CHANGES OF BOTH BREASTS: Status: ACTIVE | Noted: 2022-01-26

## 2022-01-28 ENCOUNTER — OFFICE VISIT (OUTPATIENT)
Dept: SURGICAL ONCOLOGY | Facility: CLINIC | Age: 52
End: 2022-01-28
Payer: COMMERCIAL

## 2022-01-28 VITALS
HEART RATE: 91 BPM | RESPIRATION RATE: 14 BRPM | SYSTOLIC BLOOD PRESSURE: 146 MMHG | TEMPERATURE: 98.9 F | WEIGHT: 175 LBS | HEIGHT: 70 IN | OXYGEN SATURATION: 97 % | BODY MASS INDEX: 25.05 KG/M2 | DIASTOLIC BLOOD PRESSURE: 90 MMHG

## 2022-01-28 DIAGNOSIS — N60.12 FIBROCYSTIC BREAST CHANGES OF BOTH BREASTS: ICD-10-CM

## 2022-01-28 DIAGNOSIS — R92.8 ABNORMAL MAMMOGRAM: ICD-10-CM

## 2022-01-28 DIAGNOSIS — N60.11 FIBROCYSTIC BREAST CHANGES OF BOTH BREASTS: ICD-10-CM

## 2022-01-28 DIAGNOSIS — N63.20 LEFT BREAST MASS: Primary | ICD-10-CM

## 2022-01-28 PROCEDURE — 99214 OFFICE O/P EST MOD 30 MIN: CPT | Performed by: SURGERY

## 2022-01-28 NOTE — PROGRESS NOTES
Surgical Oncology Follow Up  [address]  [facility] [place]  [address]  [address] [place] [id number]    Indy Myers  1970  [id number]      Chief Complaint   Patient presents with    Follow-up     MRI        Assessment & Plan:   She is here for follow-up after her bilateral breast MRI  Breast MRI demonstrated by BI-RADS 2  I did discuss with her MRI findings in detail  Recommendation to go annual screening mammogram   She was explained and educated how to do breast self-examination monthly basis  Options were discussed her to go back to her primary care physician to follow-up after mammogram   She prefers to follow with us and I respect her decision  We will see her after next mammogram she was told to call us with any questions or concern in the interim  Cancer History:     Oncology History    No history exists  Interval History:   She is here for follow-up after breast MRI  Review of Systems:   Review of Systems   Constitutional: Negative for chills and fever  HENT: Negative for ear pain and sore throat  Eyes: Negative for pain and visual disturbance  Respiratory: Negative for cough and shortness of breath  Cardiovascular: Negative for chest pain and palpitations  Gastrointestinal: Negative for abdominal pain and vomiting  Genitourinary: Negative for dysuria and hematuria  Musculoskeletal: Negative for arthralgias and back pain  Skin: Negative for color change and rash  Neurological: Negative for seizures and syncope  All other systems reviewed and are negative        Past Medical History     Patient Active Problem List   Diagnosis    Women's annual routine gynecological examination    Left breast mass    Abnormal mammogram    Fibrocystic breast changes of both breasts     Past Medical History:   Diagnosis Date    Fibroid 08/06/2019     Past Surgical History:   Procedure Laterality Date    CERVICAL CONE BIOPSY   SECTION      HERNIA REPAIR      HYSTERECTOMY  2019    SD LAP,RMV  ADNEXAL STRUCTURE Bilateral 11/15/2019    Procedure: SALPINGECTOMY, LAPAROSCOPIC;  Surgeon: Jasmyne Mohr MD;  [facility];  Service: Gynecology    SD LAP,VAG HYST,UTERUS 250GMS/<,SALP-OOPH N/A 11/15/2019    Procedure: HYSTERECTOMY LAPAROSCOPIC ASSISTED VAGINAL (LAVH); Surgeon: Jasmyne Mohr MD;  [facility];  Service: Gynecology     Family History   Problem Relation Age of Onset    No Known Problems Mother     No Known Problems Father     Lymphoma Sister 47        non hodgkins    No Known Problems Sister     No Known Problems Maternal Grandmother     No Known Problems Maternal Grandfather     No Known Problems Paternal Grandmother     No Known Problems Paternal Grandfather     No Known Problems Daughter     Stroke Maternal Aunt     Stroke Maternal Aunt     Stroke Maternal Aunt     No Known Problems Son     No Known Problems Maternal Uncle     No Known Problems Maternal Uncle     BRCA2 Positive Neg Hx     BRCA2 Negative Neg Hx     BRCA1 Positive Neg Hx     BRCA1 Negative Neg Hx     BRCA 1/2 Neg Hx     Ovarian cancer Neg Hx     Endometrial cancer Neg Hx     Colon cancer Neg Hx     Breast cancer additional onset Neg Hx     Breast cancer Neg Hx      Social History     Socioeconomic History    Marital status: /Civil Union     Spouse name: Not on file    Number of children: Not on file    Years of education: Not on file    Highest education level: Not on file   Occupational History    Not on file   Tobacco Use    Smoking status: Former Smoker    Smokeless tobacco: Never Used   Vaping Use    Vaping Use: Never used   Substance and Sexual Activity    Alcohol use:  Yes     Alcohol/week: 3 0 standard drinks     Types: 3 Glasses of wine per week     Comment: social    Drug use: No    Sexual activity: Yes     Partners: Male     Comment: declines std testing   Other Topics Concern    Not on file   Social History Narrative    Not on file     Social Determinants of Health     Financial Resource Strain: Not on file   Food Insecurity: Not on file   Transportation Needs: Not on file   Physical Activity: Not on file   Stress: Not on file   Social Connections: Not on file   Intimate Partner Violence: Not on file   Housing Stability: Not on file     No current outpatient medications on file  Allergies   Allergen Reactions    Penicillins Rash       Physical Exam:     Vitals:    01/28/22 1041   BP: 146/90   Pulse: 91   Resp: 14   Temp: 98 9 °F (37 2 °C)   SpO2: 97%     Physical Exam  Constitutional:       Appearance: Normal appearance  HENT:      Head: Normocephalic and atraumatic  Nose: Nose normal       Mouth/Throat:      Mouth: Mucous membranes are moist    Eyes:      Pupils: Pupils are equal, round, and reactive to light  Cardiovascular:      Rate and Rhythm: Normal rate  Pulses: Normal pulses  Heart sounds: Normal heart sounds  Pulmonary:      Effort: Pulmonary effort is normal       Breath sounds: Normal breath sounds  Chest:      Comments: The bilateral Breast(s) were examined  There was not any sign of an inverted nipple, mass, nipple discharge, skin changes or tenderness  The bilateral  breast(s) were examined in the sitting and supine position  There are not any worrisome skin changes, tenderness, nipple changes, swelling ,bleeding or evidence of mass/s in all four quadrants  Trevor survey demonstrated that there is not any evidence of any clinically suspicious axillary, pectoral or supraclavicular lymph nodes  Abdominal:      General: Bowel sounds are normal       Palpations: Abdomen is soft  Musculoskeletal:         General: Normal range of motion  Cervical back: Normal range of motion and neck supple  Skin:     General: Skin is warm  Neurological:      General: No focal deficit present        Mental Status: She is alert and oriented to person, place, and time    Psychiatric:         Mood and Affect: Mood normal          Behavior: Behavior normal          Thought Content: Thought content normal          Judgment: Judgment normal            Results & Discussion:   I did review the MRI findings  Monthly  breast self-examinations were discussed  Will follow-up as breast screening mammogram    she understands and  agrees   All patient questions were answered  Advance Care Planning/Advance Directives: Chaitanya Zepeda MD discussed the disease status with Rachell Aranda  today 01/28/22  treatment plans and follow-up with the patient

## 2022-02-09 NOTE — PROGRESS NOTES
Assessment/Plan:    Normal annual gynecological exam   Pap smear deferred secondary to hysterectomy status and normal Pap smear in September 2020 with negative Co testing  She is up-to-date with mammogram and recently had an MRI of the breast as well and continues to follow-up with the breast surgeon  Her next mammogram has already been scheduled  She is given referral for colonoscopy   We will see her back in 1 year earlier as needed       Problem List Items Addressed This Visit        Other    Women's annual routine gynecological examination - Primary    Screen for colon cancer    Relevant Orders    Ambulatory Referral to Gastroenterology            Subjective:      Patient ID: Alecia Duque is a 54 y o  female  Beronica Kraft is here today for her annual exam she is overall well  She has no gynecological complaints  She denies any menopausal problems  She has had no vaginal bleeding or discharge  She is following closely for an abnormality that was found on a mammogram   She has seen a breast surgeon and had follow-up ultrasound and an MRI of the breast we spent time reviewing all of the studies  The final evaluation revealed that it was probably a small benign area and she was recommended to follow up with a bilateral mammogram which she has scheduled  She is in need of colonoscopy/colorectal screening and she is given a referral   She had a normal Pap smear in September 2020 with negative Co testing prior to her hysterectomy  And no Pap smear is needed today  The following portions of the patient's history were reviewed and updated as appropriate:   She  has a past medical history of Fibroid (08/06/2019)    She   Patient Active Problem List    Diagnosis Date Noted    Screen for colon cancer 02/10/2022    Fibrocystic breast changes of both breasts 01/26/2022    Left breast mass 12/30/2021    Abnormal mammogram 12/30/2021    Women's annual routine gynecological examination 09/10/2020     She has a past surgical history that includes Hernia repair; Cervical cone biopsy;  section; pr lap,vag hyst,uterus 250gms/<,salp-ooph (N/A, 11/15/2019); pr lap,rmv  adnexal structure (Bilateral, 11/15/2019); and Hysterectomy (2019)  Her family history includes Cancer in her sister; Diabetes in her father; Heart disease in her father; Hypertension in her mother; Lymphoma (age of onset: 47) in her sister; No Known Problems in her daughter, maternal grandfather, maternal grandmother, maternal uncle, maternal uncle, paternal grandfather, paternal grandmother, sister, and son; Stroke in her maternal aunt, maternal aunt, and maternal aunt  She  reports that she quit smoking about 23 years ago  Her smoking use included cigarettes  She started smoking about 29 years ago  She has a 3 50 pack-year smoking history  She has never used smokeless tobacco  She reports current alcohol use of about 5 0 standard drinks of alcohol per week  She reports that she does not use drugs  Current Outpatient Medications   Medication Sig Dispense Refill    Multiple Vitamins-Minerals (ZINC PO) Take by mouth      VITAMIN D PO Take by mouth       No current facility-administered medications for this visit  Current Outpatient Medications on File Prior to Visit   Medication Sig    Multiple Vitamins-Minerals (ZINC PO) Take by mouth    VITAMIN D PO Take by mouth     No current facility-administered medications on file prior to visit  She is allergic to penicillins       Review of Systems   Constitutional: Negative for chills and fever  HENT: Negative for ear pain and sore throat  Eyes: Negative for pain and visual disturbance  Respiratory: Negative for cough and shortness of breath  Cardiovascular: Negative for chest pain and palpitations  Gastrointestinal: Negative for abdominal pain and vomiting  Genitourinary: Negative for dysuria and hematuria  Musculoskeletal: Negative for arthralgias and back pain     Skin: Negative for color change and rash  Neurological: Negative for seizures and syncope  All other systems reviewed and are negative  Objective:      LMP 09/25/2019 (Exact Date)     /84 (BP Location: Left arm, Patient Position: Sitting, Cuff Size: Standard)   Ht 5' 11" (1 803 m)   Wt 76 7 kg (169 lb)   LMP 09/25/2019 (Exact Date)   Breastfeeding Yes   BMI 23 57 kg/m²        Physical Exam  Vitals reviewed  Constitutional:       General: She is not in acute distress  Appearance: Normal appearance  She is normal weight  She is not ill-appearing  Comments: Youthful appearing white female    HENT:      Head: Normocephalic and atraumatic  Neck:      Thyroid: No thyromegaly or thyroid tenderness  Cardiovascular:      Rate and Rhythm: Normal rate and regular rhythm  Pulses: Normal pulses  Heart sounds: Normal heart sounds  No murmur heard  Pulmonary:      Effort: Pulmonary effort is normal  No respiratory distress  Breath sounds: Normal breath sounds  No wheezing  Chest:   Breasts: Breasts are symmetrical       Right: Normal  No swelling, inverted nipple, mass, nipple discharge, skin change, tenderness, axillary adenopathy or supraclavicular adenopathy  Left: Normal  No swelling, inverted nipple, mass, nipple discharge, skin change, tenderness, axillary adenopathy or supraclavicular adenopathy  Abdominal:      General: Abdomen is flat  There is no distension  Palpations: Abdomen is soft  There is no mass  Tenderness: There is no abdominal tenderness  There is no right CVA tenderness, left CVA tenderness, guarding or rebound  Hernia: No hernia is present  Genitourinary:     Comments: Normal female, no vulvar or vaginal lesions, [place]'s and Bartholin glands are grossly normal   Urethra is in the midline and normal appearance  Cervix and  Uterus is surgically absent  There are no adnexal masses  The exam is nontender    Rectal exam reveals normal sphincter tone, no palpable masses and stool is guaiac negative  Musculoskeletal:         General: Normal range of motion  Cervical back: Neck supple  No muscular tenderness  Lymphadenopathy:      Upper Body:      Right upper body: No supraclavicular, axillary or pectoral adenopathy  Left upper body: No supraclavicular, axillary or pectoral adenopathy  Neurological:      General: No focal deficit present  Mental Status: She is alert and oriented to person, place, and time     Psychiatric:         Mood and Affect: Mood normal          Behavior: Behavior normal

## 2022-02-10 ENCOUNTER — ANNUAL EXAM (OUTPATIENT)
Dept: OBGYN CLINIC | Facility: CLINIC | Age: 52
End: 2022-02-10
Payer: COMMERCIAL

## 2022-02-10 VITALS
SYSTOLIC BLOOD PRESSURE: 134 MMHG | WEIGHT: 169 LBS | DIASTOLIC BLOOD PRESSURE: 84 MMHG | HEIGHT: 71 IN | BODY MASS INDEX: 23.66 KG/M2

## 2022-02-10 DIAGNOSIS — Z12.11 SCREEN FOR COLON CANCER: ICD-10-CM

## 2022-02-10 DIAGNOSIS — Z01.419 WOMEN'S ANNUAL ROUTINE GYNECOLOGICAL EXAMINATION: Primary | ICD-10-CM

## 2022-02-10 PROCEDURE — S0612 ANNUAL GYNECOLOGICAL EXAMINA: HCPCS | Performed by: OBSTETRICS & GYNECOLOGY

## 2022-02-10 NOTE — PATIENT INSTRUCTIONS

## 2022-02-17 ENCOUNTER — PREP FOR PROCEDURE (OUTPATIENT)
Dept: GASTROENTEROLOGY | Facility: CLINIC | Age: 52
End: 2022-02-17

## 2022-02-17 DIAGNOSIS — Z12.11 SCREEN FOR COLON CANCER: Primary | ICD-10-CM

## 2022-04-15 ENCOUNTER — TELEPHONE (OUTPATIENT)
Dept: OBGYN CLINIC | Facility: CLINIC | Age: 52
End: 2022-04-15

## 2022-05-20 ENCOUNTER — ANESTHESIA (OUTPATIENT)
Dept: GASTROENTEROLOGY | Facility: AMBULARY SURGERY CENTER | Age: 52
End: 2022-05-20

## 2022-05-20 ENCOUNTER — ANESTHESIA EVENT (OUTPATIENT)
Dept: GASTROENTEROLOGY | Facility: AMBULARY SURGERY CENTER | Age: 52
End: 2022-05-20

## 2022-05-20 ENCOUNTER — HOSPITAL ENCOUNTER (OUTPATIENT)
Dept: GASTROENTEROLOGY | Facility: AMBULARY SURGERY CENTER | Age: 52
Setting detail: OUTPATIENT SURGERY
Discharge: HOME | End: 2022-05-20
Attending: INTERNAL MEDICINE | Admitting: INTERNAL MEDICINE
Payer: COMMERCIAL

## 2022-05-20 VITALS
BODY MASS INDEX: 23.05 KG/M2 | TEMPERATURE: 97 F | WEIGHT: 161 LBS | SYSTOLIC BLOOD PRESSURE: 114 MMHG | OXYGEN SATURATION: 100 % | HEART RATE: 67 BPM | HEIGHT: 70 IN | DIASTOLIC BLOOD PRESSURE: 63 MMHG | RESPIRATION RATE: 14 BRPM

## 2022-05-20 DIAGNOSIS — Z12.11 SCREEN FOR COLON CANCER: ICD-10-CM

## 2022-05-20 PROCEDURE — 45385 COLONOSCOPY W/LESION REMOVAL: CPT | Performed by: INTERNAL MEDICINE

## 2022-05-20 PROCEDURE — 45380 COLONOSCOPY AND BIOPSY: CPT | Performed by: INTERNAL MEDICINE

## 2022-05-20 PROCEDURE — 88305 TISSUE EXAM BY PATHOLOGIST: CPT | Performed by: PATHOLOGY

## 2022-05-20 RX ORDER — SODIUM CHLORIDE, SODIUM LACTATE, POTASSIUM CHLORIDE, CALCIUM CHLORIDE 600; 310; 30; 20 MG/100ML; MG/100ML; MG/100ML; MG/100ML
50 INJECTION, SOLUTION INTRAVENOUS CONTINUOUS
Status: DISCONTINUED | OUTPATIENT
Start: 2022-05-20 | End: 2022-05-24 | Stop reason: HOSPADM

## 2022-05-20 RX ORDER — LIDOCAINE HYDROCHLORIDE 10 MG/ML
INJECTION, SOLUTION EPIDURAL; INFILTRATION; INTRACAUDAL; PERINEURAL AS NEEDED
Status: DISCONTINUED | OUTPATIENT
Start: 2022-05-20 | End: 2022-05-20

## 2022-05-20 RX ORDER — PROPOFOL 10 MG/ML
INJECTION, EMULSION INTRAVENOUS AS NEEDED
Status: DISCONTINUED | OUTPATIENT
Start: 2022-05-20 | End: 2022-05-20

## 2022-05-20 RX ADMIN — PROPOFOL 50 MG: 10 INJECTION, EMULSION INTRAVENOUS at 10:33

## 2022-05-20 RX ADMIN — PROPOFOL 100 MG: 10 INJECTION, EMULSION INTRAVENOUS at 10:27

## 2022-05-20 RX ADMIN — PROPOFOL 50 MG: 10 INJECTION, EMULSION INTRAVENOUS at 10:48

## 2022-05-20 RX ADMIN — LIDOCAINE HYDROCHLORIDE 50 MG: 10 INJECTION, SOLUTION EPIDURAL; INFILTRATION; INTRACAUDAL; PERINEURAL at 10:27

## 2022-05-20 RX ADMIN — PROPOFOL 50 MG: 10 INJECTION, EMULSION INTRAVENOUS at 10:38

## 2022-05-20 RX ADMIN — PROPOFOL 50 MG: 10 INJECTION, EMULSION INTRAVENOUS at 10:36

## 2022-05-20 RX ADMIN — PROPOFOL 50 MG: 10 INJECTION, EMULSION INTRAVENOUS at 10:51

## 2022-05-20 RX ADMIN — PROPOFOL 50 MG: 10 INJECTION, EMULSION INTRAVENOUS at 10:42

## 2022-05-20 RX ADMIN — PROPOFOL 50 MG: 10 INJECTION, EMULSION INTRAVENOUS at 10:30

## 2022-05-20 RX ADMIN — SODIUM CHLORIDE, SODIUM LACTATE, POTASSIUM CHLORIDE, AND CALCIUM CHLORIDE: .6; .31; .03; .02 INJECTION, SOLUTION INTRAVENOUS at 10:27

## 2022-05-20 NOTE — ANESTHESIA PREPROCEDURE EVALUATION
Procedure:  COLONOSCOPY    Relevant Problems   No relevant active problems     Fibroid  Sleep apnea          Physical Exam    Airway    Mallampati score: II  TM Distance: >3 FB  Neck ROM: full     Dental       Cardiovascular      Pulmonary      Other Findings        Anesthesia Plan  ASA Score- 2     Anesthesia Type- IV sedation with anesthesia with ASA Monitors  Additional Monitors:   Airway Plan:           Plan Factors-    Chart reviewed  Induction- intravenous  Postoperative Plan-     Informed Consent- Anesthetic plan and risks discussed with patient  I personally reviewed this patient with the CRNA  Discussed and agreed on the Anesthesia Plan with the CRNA  Mariam Pac

## 2022-05-20 NOTE — H&P
History and Physical - [facility]  Cletus Primrose 54 y o  female MRN: [id number]    HPI: Cletus Primrose is a 54y o  year old female who presents with colon cancer screening  Review of Systems    Historical Information   Past Medical History:   Diagnosis Date    Fibroid 2019    Sleep apnea      Past Surgical History:   Procedure Laterality Date    CERVICAL CONE BIOPSY       SECTION      HERNIA REPAIR      HYSTERECTOMY  2019    VT LAP,RMV  ADNEXAL STRUCTURE Bilateral 11/15/2019    Procedure: SALPINGECTOMY, LAPAROSCOPIC;  Surgeon: Araceli Sampson MD;  [facility];  Service: Gynecology    VT LAP,VAG HYST,UTERUS 250GMS/<,SALP-OOPH N/A 11/15/2019    Procedure: HYSTERECTOMY LAPAROSCOPIC ASSISTED VAGINAL (LAVH);   Surgeon: Araceli Sampson MD;  [facility];  Service: Gynecology     Social History   Social History     Substance and Sexual Activity   Alcohol Use Yes    Alcohol/week: 5 0 standard drinks    Types: 5 Glasses of wine per week    Comment: social     Social History     Substance and Sexual Activity   Drug Use No     Social History     Tobacco Use   Smoking Status Former Smoker    Packs/day: 0 50    Years: 7 00    Pack years: 3 50    Types: Cigarettes    Start date: 1993   Emaline Folds Quit date: 1999    Years since quittin 3   Smokeless Tobacco Never Used     Family History   Problem Relation Age of Onset    Hypertension Mother     Diabetes Father     Heart disease Father     Lymphoma Sister 47        non hodgkins    Cancer Sister         Non-hodgkin's Lymphoma    No Known Problems Sister     No Known Problems Maternal Grandmother     No Known Problems Maternal Grandfather     No Known Problems Paternal Grandmother     No Known Problems Paternal Grandfather     No Known Problems Daughter     Stroke Maternal Aunt     Stroke Maternal Aunt     Stroke Maternal Aunt     No Known Problems Son     No Known Problems Maternal Uncle  No Known Problems Maternal Uncle     BRCA2 Positive Neg Hx     BRCA2 Negative Neg Hx     BRCA1 Positive Neg Hx     BRCA1 Negative Neg Hx     BRCA 1/2 Neg Hx     Ovarian cancer Neg Hx     Endometrial cancer Neg Hx     Colon cancer Neg Hx     Breast cancer additional onset Neg Hx     Breast cancer Neg Hx        Meds/Allergies     (Not in a hospital admission)      Allergies   Allergen Reactions    Penicillins Rash       Objective     /85   Pulse 65   Temp (!) 97 3 °F (36 3 °C) (Temporal)   Resp 18   Ht 5' 10" (1 778 m)   Wt 73 kg (161 lb)   LMP 09/25/2019 (Exact Date)   SpO2 98%   BMI 23 10 kg/m²       PHYSICAL EXAM    Gen: NAD  CV: RRR  CHEST: Clear  ABD: soft, NT/ND  EXT: no edema  Neuro: AAO      ASSESSMENT/PLAN:  This is a 54y o  year old female here for colon cancer screening  PLAN:   Procedure:  Colonoscopy

## 2022-10-12 PROBLEM — Z12.11 SCREEN FOR COLON CANCER: Status: RESOLVED | Noted: 2022-02-10 | Resolved: 2022-10-12

## 2022-11-28 ENCOUNTER — HOSPITAL ENCOUNTER (OUTPATIENT)
Dept: MAMMOGRAPHY | Facility: HOSPITAL | Age: 52
Discharge: HOME | End: 2022-11-28
Attending: SURGERY

## 2022-11-28 VITALS — BODY MASS INDEX: 23.62 KG/M2 | WEIGHT: 165 LBS | HEIGHT: 70 IN

## 2022-11-28 DIAGNOSIS — N60.12 FIBROCYSTIC BREAST CHANGES OF BOTH BREASTS: ICD-10-CM

## 2022-11-28 DIAGNOSIS — N60.11 FIBROCYSTIC BREAST CHANGES OF BOTH BREASTS: ICD-10-CM

## 2022-11-29 ENCOUNTER — TELEPHONE (OUTPATIENT)
Dept: SURGICAL ONCOLOGY | Facility: CLINIC | Age: 52
End: 2022-11-29

## 2022-11-29 NOTE — TELEPHONE ENCOUNTER
Called patient and rescheduled appt on 12/2/22 with Dr Gianna Tadeo at [facility] to 12/7/22 at [facility] location  I went over the address with patient and confirmed appt

## 2022-12-05 ENCOUNTER — TELEPHONE (OUTPATIENT)
Dept: HEMATOLOGY ONCOLOGY | Facility: CLINIC | Age: 52
End: 2022-12-05

## 2022-12-05 NOTE — TELEPHONE ENCOUNTER
CALL TRANSFER   Reason for patient call? Patient would like to discuss whether or not she is able to make her appointment a virtual visit  Patient's primary physician? Dr Ilir Garcia   RN call was transferred to and time it was transferred? Chano Mckeon @ 10:26AM   Informed patient that the message will be forwarded to the team and someone will get back to them as soon as possible    Did you relay this information to the patient?  Yes

## 2022-12-14 ENCOUNTER — OFFICE VISIT (OUTPATIENT)
Dept: SURGICAL ONCOLOGY | Facility: CLINIC | Age: 52
End: 2022-12-14

## 2022-12-14 VITALS
OXYGEN SATURATION: 99 % | HEIGHT: 70 IN | RESPIRATION RATE: 18 BRPM | SYSTOLIC BLOOD PRESSURE: 122 MMHG | DIASTOLIC BLOOD PRESSURE: 84 MMHG | TEMPERATURE: 97.4 F | WEIGHT: 172 LBS | BODY MASS INDEX: 24.62 KG/M2 | HEART RATE: 72 BPM

## 2022-12-14 DIAGNOSIS — Z12.31 VISIT FOR SCREENING MAMMOGRAM: ICD-10-CM

## 2022-12-14 DIAGNOSIS — R92.2 DENSE BREAST TISSUE: Primary | ICD-10-CM

## 2022-12-14 PROBLEM — R92.30 DENSE BREAST TISSUE: Status: ACTIVE | Noted: 2022-12-14

## 2022-12-14 NOTE — PROGRESS NOTES
Surgical Oncology Follow Up       [address] [facility]  [facility] [place]  [address]  [place] [id number]    Kyaw Devendra  1970  [id number]  [address] [facility]  [facility] [place]  [address]  [place] [address]    Diagnoses and all orders for this visit:    Dense breast tissue  -     Mammo screening bilateral w 3d & cad; Future    Visit for screening mammogram  -     Mammo screening bilateral w 3d & cad; Future        Chief Complaint   Patient presents with   • Follow-up       No follow-ups on file  History of Present Illness: The patient returns today in follow-up for her history of an abnormal mammogram in the setting of dense breasts  Focal asymmetry and an ovoid was were seen in the left breast on mammography in December 2021  It was at this time that she was referred to see Dr Maribell Wellington for consultation  Subsequent breast MRI performed in January 0121 showed a complicated cyst and fibroglandular tissue, but nothing concerning for malignancy  Follow-up screening mammogram performed last month was Bi-RADS 2, and continued annual screening was recommended  She has no personal or family history of breast or ovarian cancer, and there are no documented genetic mutations in her family  She comes in today for a clinical exam and discussion regarding further surveillance  She denies any new lumps or masses on self-exam   She denies any systemic complaints  Review of Systems   Constitutional: Negative for activity change, appetite change, fatigue and unexpected weight change  Respiratory: Negative  Negative for cough and shortness of breath  Cardiovascular: Negative  Gastrointestinal: Negative  Negative for abdominal distention, abdominal pain, nausea and vomiting  Musculoskeletal: Negative  Skin: Negative  Negative for color change  Neurological: Negative    Negative for dizziness and headaches  Hematological: Negative  Negative for adenopathy  Psychiatric/Behavioral: Negative  Patient Active Problem List   Diagnosis   • Women's annual routine gynecological examination   • Left breast mass   • Abnormal mammogram   • Fibrocystic breast changes of both breasts   • Dense breast tissue     Past Medical History:   Diagnosis Date   • Fibroid 2019   • Sleep apnea      Past Surgical History:   Procedure Laterality Date   • CERVICAL CONE BIOPSY     •  SECTION     • HERNIA REPAIR     • HYSTERECTOMY  2019   • MI LAP,RMV  ADNEXAL STRUCTURE Bilateral 11/15/2019    Procedure: SALPINGECTOMY, LAPAROSCOPIC;  Surgeon: Ermelinda Powell MD;  Location: [facility];  Service: Gynecology   • MI LAP,VAG HYST,UTERUS 250GMS/<,SALP-OOPH N/A 11/15/2019    Procedure: HYSTERECTOMY LAPAROSCOPIC ASSISTED VAGINAL (LAVH);   Surgeon: Ermelinda Powell MD;  Location: [facility];  Service: Gynecology     Family History   Problem Relation Age of Onset   • Hypertension Mother    • Diabetes Father    • Heart disease Father    • Lymphoma Sister 47        non hodgkins   • Cancer Sister         Non-hodgkin's Lymphoma   • No Known Problems Sister    • No Known Problems Maternal Grandmother    • No Known Problems Maternal Grandfather    • No Known Problems Paternal Grandmother    • No Known Problems Paternal Grandfather    • No Known Problems Daughter    • Stroke Maternal Aunt    • Stroke Maternal Aunt    • Stroke Maternal Aunt    • No Known Problems Son    • No Known Problems Maternal Uncle    • No Known Problems Maternal Uncle    • BRCA2 Positive Neg Hx    • BRCA2 Negative Neg Hx    • BRCA1 Positive Neg Hx    • BRCA1 Negative Neg Hx    • BRCA 1/2 Neg Hx    • Ovarian cancer Neg Hx    • Endometrial cancer Neg Hx    • Colon cancer Neg Hx    • Breast cancer additional onset Neg Hx    • Breast cancer Neg Hx      Social History     Socioeconomic History   • Marital status: /Civil Union     Spouse name: Not on file   • Number of children: Not on file   • Years of education: Not on file   • Highest education level: Not on file   Occupational History   • Not on file   Tobacco Use   • Smoking status: Former     Packs/day: 0 50     Years: 7 00     Pack years: 3 50     Types: Cigarettes     Start date: 1993     Quit date: 1999     Years since quittin 9   • Smokeless tobacco: Never   Vaping Use   • Vaping Use: Never used   Substance and Sexual Activity   • Alcohol use: Yes     Alcohol/week: 5 0 standard drinks     Types: 5 Glasses of wine per week     Comment: social   • Drug use: No   • Sexual activity: Yes     Partners: Male     Birth control/protection: None     Comment: declines std testing   Other Topics Concern   • Not on file   Social History Narrative   • Not on file     Social Determinants of Health     Financial Resource Strain: Not on file   Food Insecurity: Not on file   Transportation Needs: Not on file   Physical Activity: Not on file   Stress: Not on file   Social Connections: Not on file   Intimate Partner Violence: Not on file   Housing Stability: Not on file       Current Outpatient Medications:   •  Multiple Vitamins-Minerals (ZINC PO), Take by mouth, Disp: , Rfl:   Allergies   Allergen Reactions   • Penicillins Rash     Vitals:    22 1134   BP: 122/84   Pulse: 72   Resp: 18   Temp: (!) 97 4 °F (36 3 °C)   SpO2: 99%       Physical Exam  Vitals reviewed  Constitutional:       General: She is not in acute distress  Appearance: Normal appearance  She is normal weight  She is not ill-appearing or toxic-appearing  HENT:      Head: Normocephalic and atraumatic  Nose: Nose normal       Mouth/Throat:      Mouth: Mucous membranes are moist    Eyes:      General: No scleral icterus  Extraocular Movements: Extraocular movements intact  Conjunctiva/sclera: Conjunctivae normal       Pupils: Pupils are equal, round, and reactive to light     Cardiovascular:      Rate and Rhythm: Normal rate  Pulmonary:      Effort: Pulmonary effort is normal    Chest:   Breasts:     Right: Normal  No inverted nipple, mass, skin change or tenderness  Left: Normal  No inverted nipple, mass, skin change or tenderness  Comments: Breasts are smooth and symmetric bilaterally  There is some palpable fibroglandular tissue, but no dominant masses or nodule are appreciated  There are no skin changes or adenopathy evident on exam   Abdominal:      Palpations: Abdomen is soft  Musculoskeletal:         General: Normal range of motion  Cervical back: Normal range of motion and neck supple  No tenderness  Lymphadenopathy:      Cervical: No cervical adenopathy  Upper Body:      Right upper body: No supraclavicular, axillary or pectoral adenopathy  Left upper body: No supraclavicular, axillary or pectoral adenopathy  Skin:     General: Skin is warm and dry  Neurological:      General: No focal deficit present  Mental Status: She is alert and oriented to person, place, and time  Psychiatric:         Mood and Affect: Mood normal          Behavior: Behavior normal          Thought Content: Thought content normal          Judgment: Judgment normal            Imaging  Mammo screening bilateral w 3d & cad    Result Date: 11/30/2022  Narrative: DIAGNOSIS: Fibrocystic breast changes of both breasts TECHNIQUE: Digital screening mammography was performed  Computer Aided Detection (CAD) analyzed all applicable images  COMPARISONS: Prior breast imaging dated: 12/17/2021, 11/22/2021, 10/19/2020, 08/14/2019, 08/01/2019, 06/15/2017, 10/09/2015, 10/09/2015, 10/07/2014, and 10/03/2013 RELEVANT HISTORY: Family Breast Cancer History: History of breast cancer in Neg Hx  Family Medical History: No known relevant family medical history  Personal History: No known relevant hormone history  Surgical history includes hysterectomy  No known relevant medical history   The patient is scheduled in a reminder system for screening mammography  8-10% of cancers will be missed on mammography  Management of a palpable abnormality must be based on clinical grounds  Patients will be notified of their results via letter from our facility  Accredited by [employer] [facility] [employer] and [facility]  RISK ASSESSMENT: 5 Year Tyrer-Cuzick: 1 23 % 10 Year Tyrer-Cuzick: 2 59 % Lifetime Tyrer-Cuzick: 11 16 % TISSUE DENSITY: The breasts are heterogeneously dense, which may obscure small masses  INDICATION: Dana Gil is a 50 y o  female presenting for screening mammography  FINDINGS: Bilateral There are no suspicious masses, grouped microcalcifications or areas of unexplained architectural distortion  The skin and nipple areolar complex are unremarkable  Stable asymmetries  Impression: No mammographic evidence of malignancy  ASSESSMENT/BI-RADS CATEGORY: Left: 2 - Benign Right: 2 - Benign Overall: 2 - Benign RECOMMENDATION:      - Routine screening mammogram in 1 year for both breasts  Workstation ID: [id number]     I reviewed the above imaging data  Discussion/Summary: This is a very pleasant 51 y/o female who presents today for continued dense breast management  Her most recent imaging has no worrisome findings  Given her lack of family/personal history and the fact that her breast cancer risk score is not elevated, there is no need for increased surveillance  I did discuss the potential role of ABUS, but explained that annual 3D mammography is adequate for screening  I will order her next mammogram, and she can choose if she would like to continue to be seen in our office or if she would just like to be followed by her OB-Gyn  She is agreeable to the plan, all questions have been answered

## 2023-02-27 ENCOUNTER — TELEPHONE (OUTPATIENT)
Dept: OBGYN CLINIC | Facility: CLINIC | Age: 53
End: 2023-02-27

## 2023-02-27 DIAGNOSIS — R10.2 PELVIC PAIN: Primary | ICD-10-CM

## 2023-02-27 NOTE — TELEPHONE ENCOUNTER
Pt called and states she has been having pelvic discomfort/pressure  She denies any dysuria, fever, nausea, vomiting, vaginal discharge or irritation  States having normal bowel movements  Spoke with Dr Aydee Cook and can order pelvic ultrasound in the meantime and can do tylenol or motrin for the pain/discomfort  Can also apply heat to the area  Advised if pain or symptoms become worse then she would need to go to to the ER for further evaluation  Advised orders will be placed for pelvic ultrasound and number provided to central scheduling

## 2023-03-03 ENCOUNTER — HOSPITAL ENCOUNTER (OUTPATIENT)
Dept: ULTRASOUND IMAGING | Facility: HOSPITAL | Age: 53
End: 2023-03-03
Attending: STUDENT IN AN ORGANIZED HEALTH CARE EDUCATION/TRAINING PROGRAM

## 2023-03-03 DIAGNOSIS — R10.2 PELVIC PAIN: ICD-10-CM

## 2023-03-13 ENCOUNTER — OFFICE VISIT (OUTPATIENT)
Dept: OBGYN CLINIC | Facility: CLINIC | Age: 53
End: 2023-03-13

## 2023-03-13 ENCOUNTER — TELEPHONE (OUTPATIENT)
Dept: OBGYN CLINIC | Facility: CLINIC | Age: 53
End: 2023-03-13

## 2023-03-13 VITALS — DIASTOLIC BLOOD PRESSURE: 90 MMHG | BODY MASS INDEX: 24.68 KG/M2 | SYSTOLIC BLOOD PRESSURE: 140 MMHG | WEIGHT: 172 LBS

## 2023-03-13 DIAGNOSIS — M79.18 RIGHT BUTTOCK PAIN: ICD-10-CM

## 2023-03-13 DIAGNOSIS — R10.2 PELVIC PAIN: Primary | ICD-10-CM

## 2023-03-13 NOTE — PROGRESS NOTES
Zuleima Jacobo 71-year-old female  with one prior C/S followed by a   She called the office on 23 complaining of pelvic discomfort/pressure and pelvic US was ordered  Recent pelvic US as of 3/3/23 is normal  No evidence of ovarian cyst  Uterus is absent  She states she is still experiencing discomfort  Lower pelvic pain started 2 weeks ago after throwing a ball and leaning over  Pain worsened after skiing  Right gluteal muscle has also been sore for 6 months  Patient has been able to workout this week, but still having lower pelvic pressure  Has not taken any medication for pain  Denies vaginal bleeding, discharge, or dysuria  Patient is sexually active, no pain with intimacy  History of hysterectomy in 2019 due to fibroids  Had colonoscopy in May, with benign polpys removed  She had a UA done last week at her PCP's office which was negative  ROS:  As indicated in HPI  All other ROS negative  Physical Exam  Constitutional:       Appearance: Normal appearance  Genitourinary:      No lesions in the vagina  Right Labia: No rash, tenderness, lesions, skin changes or Bartholin's cyst      Left Labia: No tenderness, lesions, skin changes, Bartholin's cyst or rash  No labial fusion noted  Vaginal cuff intact  No vaginal tenderness or bleeding  No vaginal prolapse present  No vaginal atrophy present  Right Adnexa: palpable  Right Adnexa: not tender, not full and no mass present  Left Adnexa: palpable  Left Adnexa: not tender, not full and no mass present  Cervix is absent  Uterus is absent  Pelvic exam was performed with patient in the lithotomy position  HENT:      Head: Normocephalic and atraumatic  Abdominal:       Musculoskeletal:      Cervical back: Neck supple  Comments: There was no tenderness/pain elicited with palpation in her thoraci/lumbar or sciatic regions      Neurological:      Mental Status: She is alert    Skin:     General: Skin is warm  Psychiatric:         Behavior: Behavior is cooperative  Vitals and nursing note reviewed  Misti Mckeon was seen today for pelvic pain  Diagnoses and all orders for this visit:    Pelvic pain  -     Ambulatory Referral to Physical Therapy; Future    Right buttock pain  -     Ambulatory Referral to Physical Therapy; Future      I explained that I suspect her symptoms are musculoskeletal in nature  She states her pcp stated the same  I recommend she have an evaluation with pelvic health PT - she is agreeable  A referral was provided  Follow-up as needed

## 2023-03-13 NOTE — TELEPHONE ENCOUNTER
T/c to patient , With US being normal and history of having a hysterectomy , patient should follow up with her Primary or Gastro, or pelvic floor physical therapy  if referral needed will place   MM CMA

## 2023-03-15 ENCOUNTER — EVALUATION (OUTPATIENT)
Dept: PHYSICAL THERAPY | Facility: REHABILITATION | Age: 53
End: 2023-03-15

## 2023-03-15 DIAGNOSIS — M79.18 RIGHT BUTTOCK PAIN: ICD-10-CM

## 2023-03-15 DIAGNOSIS — R10.2 PELVIC PAIN: Primary | ICD-10-CM

## 2023-03-15 NOTE — PROGRESS NOTES
PT Evaluation     Today's date: 3/15/2023  Patient name: Joseline Castro  : 1970  MRN: [id number]  Referring provider: SONI Pineda  Dx:   Encounter Diagnosis     ICD-10-CM    1  Pelvic pain  R10 2 Ambulatory Referral to Physical Therapy      2  Right buttock pain  M79 18 Ambulatory Referral to Physical Therapy          Start Time: 1450  Stop Time: 1538  Total time in clinic (min): 48 minutes    Assessment  Assessment details: Joseline Castro is a 50y o  year old female who presents to IE with right pelvic pain and buttock pain  Patient presents with overall adequate BLE strength and range of motion  She presents with tenderness and recreation of pain to right ischial tuberosity and right hip flexor  Evident soft tissue restrictions to right psoas which may be secondary to longstanding proximal hamstring strain  Based on patient subjective and examination this visit, likely not related to pelvic floor dysfunction, however will consider further pelvic floor assessment in future visits as needed  Sha Lagos presents with the impairments as listed above and would benefit from Physical Therapy to address these impairments, improved quality of life and to maximize function  Impairments: activity intolerance, lacks appropriate home exercise program and pain with function  Barriers to therapy: Financial - $60 copay    Goals  Short-Term Goals: 2-4 weeks  1  Patient will report 50% decrease in right hip pain  2  Patient will demonstrate decreased tension and tenderness to right hip flexor  Long-Term Goals: 4-6 weeks  1  Patient will be able to bend forward without limitation from right hip pain  2  Patient will participate in previous level of exercise without limitation from hip pain  3  Patient independent with HEP at time of discharge         Plan  Patient would benefit from: skilled physical therapy and PT eval  Planned modality interventions: cryotherapy and thermotherapy: hydrocollator packs  Planned therapy interventions: abdominal trunk stabilization, home exercise program, functional ROM exercises, therapeutic exercise, therapeutic activities, stretching, strengthening, patient education, neuromuscular re-education, massage, manual therapy and joint mobilization  Frequency: 1x week  Duration in visits: 5  Duration in weeks: 5  Plan of Care beginning date: 3/15/2023  Plan of Care expiration date: 2023  Treatment plan discussed with: patient        PT Pelvic Floor Subjective:   History of Present Illness:   Patient is a 50 y o  presenting to physical therapy with complaints of right buttock pain  She reports she was doing a boot camp class in early fall and having piriformis pain which was also llimiting in certain positions with yoga  A few weeks ago she was throwing a ball to her dog and began to feel lower right pelvic pain  She then went skiing and felt more discomfort in the "pelvic floor area " She had an pelvic exam with her OBGYN, pelvic US and UA which were all negative  She has slowly been getting back into yoga and modifying positions  She feels it in the right lower groin with bending forward and lifting the leg   She is only feeling discomfort with movement not at rest   Social Support:     Lives with:  Spouse    Work status: employed full time (self-employed )  Diet and Exercise:      Exercise type: yoga    Exercise frequency: daily    Cardio  HIIT  Yoga    5x/week  OB/ gyn History    Gestational History:     Prior Pregnancy: Yes      Number of prior pregnancies: 2    Number of term pregnancies: 2    Delivery Type:  section      Number of caesarian sections: 1    Number of vaginal deliveries after caesarian: 1    Delivery Complications:  Emergency  - postpartum hemorrhaging     Menstrual History:      Menstrual irregularities irregular menses    Menopausal: no menopause  no hormone replacement therapy  Bladder Function:     Voiding Difficulties negative for: urgency, frequent urination and incomplete emptying      Voiding Difficulties comments:     Voiding frequency: every 3-4 hours    Urinary leakage: no urine leakage    Nocturia (episodes per night): 1    Painful urination: No      Fluid Intake Type: Water    Intake (ounces): Intake (ounces) comment: Water: at least 8 glasses   Coffee: 1-2 cups   [place] water occasionally  Bowel Function:     Bowel frequency: daily    Stool softener use: no stool softeners  Sexual Function:     Sexually Active:  Sexually active    Pain during intercourse: No    Pain:     Current pain ratin    At best pain ratin    At worst pain ratin    Location:  Right groin or right glute     Quality:  Pressure (pulling)    Aggravating factors:  Exercise    Relieving factors:  Rest and heat    Progression:  No change  Patient Goals:     Patient goals for therapy:  Decreased pain, improved comfort, improved pain management, improved quality of life and return to sport/leisure activities    Other patient goals:  "see if I can get this to feel better"       Objective     Palpation     Right   Hypertonic in the iliopsoas  Tenderness of the iliopsoas, piriformis, proximal semimembranosus and proximal semitendinosus  Tenderness     Left Hip   No tenderness in the ischial tuberosity  Right Hip   Tenderness in the ischial tuberosity  Active Range of Motion     Lumbar   Flexion: Active lumbar flexion: Right ischial tuberosity    WFL and with pain  Extension:  WFL  Left lateral flexion:  WFL  Right lateral flexion:  [facility]    Strength/Myotome Testing     Left Hip   Planes of Motion   Flexion: 5  Extension: 4+  Abduction: 4+  External rotation: 5  Internal rotation: 5    Right Hip   Planes of Motion   Flexion: 5  Extension: 4+  Abduction: 4  External rotation: 5  Internal rotation: 5 (pain in ischial tube and groin)    Left Knee   Flexion: 5  Extension: 5    Right Knee   Flexion: 5 (pain in buttocks and groin)  Extension: 5    Left Ankle/Foot   Dorsiflexion: 5    Right Ankle/Foot   Dorsiflexion: 5    General Comments:      Lumbar Comments  Isch tube     Psoas MWM  Hip flexor isometrics  HS sets pball  PPT   Hip flexor st nv                   Precautions: Hysterectomy 6716, umbilical hernia repair 9766      Manuals 3/15            Pelvic Floor Muscle assessment             R psoas STM/MWM done                         Neuro Re-Ed             R hip flexion isometric 10x5"            HS sets pball 10x5"                                                                                          Ther Ex             PPT 10x5"            R hip flexor stretch EOT nv            HS bridging             Pball bridge rolls             Bridging on pball             TB hip flexion                          Ther Activity             Patient Education

## 2023-03-22 ENCOUNTER — OFFICE VISIT (OUTPATIENT)
Dept: PHYSICAL THERAPY | Facility: REHABILITATION | Age: 53
End: 2023-03-22

## 2023-03-22 DIAGNOSIS — R10.2 PELVIC PAIN: Primary | ICD-10-CM

## 2023-03-22 DIAGNOSIS — M79.18 RIGHT BUTTOCK PAIN: ICD-10-CM

## 2023-03-23 ENCOUNTER — APPOINTMENT (OUTPATIENT)
Dept: PHYSICAL THERAPY | Facility: REHABILITATION | Age: 53
End: 2023-03-23

## 2023-03-30 ENCOUNTER — APPOINTMENT (OUTPATIENT)
Dept: PHYSICAL THERAPY | Facility: REHABILITATION | Age: 53
End: 2023-03-30

## 2023-06-12 NOTE — PROGRESS NOTES
Assessment/Plan   Problem List Items Addressed This Visit    None  Visit Diagnoses     Well woman exam    -  Primary    Encounter for screening mammogram for malignant neoplasm of breast              Discussion    All questions have been answered to her satisfaction  RTO for APE or sooner if needed      Subjective     HPI   Irasema Garcia is a 50 y o  female who presents for annual well woman exam    LMP - ;s/p hysterectomy 2019 due to menorrhagia and fibroids  No vulvar itch/burn; No vaginal itch/burn; No abn discharge or odor; No urinary sx - burning/pain/frequency/hematuria    (+) SBEs - no breast masses, asymmetry, nipple discharge or bleeding, changes in skin of breast, or breast tenderness bilaterally    Pt was seen for pelvic pain in the spring  Sx have improved  She saw PT and thought more musculoskeletal pain and overall pt doing better  No menopausal symptoms: No hot flashes/night sweats, problems with intercourse, vaginal dryness; sleeping well  Pt is sexually active in a mutually monog/ sexual relationship; No issues with intercourse; She declines sti/hiv/hep testing; Feels safe at home      (+) PCP for routine Bw/care; Last Pap - 9/10/20 WNL neg HPV   History of abnormal Pap smear: denies   Last mammo - 22 BIRADs 2   History of abnormal mammogram: saw breast specialist due to abn mammo- follow up imaging has been reassuring  Last colonoscopy - 2022-due 3 years       Review of Systems   Constitutional: Negative  Respiratory: Negative  Gastrointestinal: Negative  Endocrine: Negative  Genitourinary: Negative          The following portions of the patient's history were reviewed and updated as appropriate: allergies, current medications, past family history, past medical history, past social history, past surgical history and problem list          OB History        2    Para   2    Term   2            AB        Living   2       SAB        IAB Ectopic        Multiple        Live Births   2           Obstetric Comments   Age of menarche: 17  2 pregnancies: 2 live births 1 boy 1 girl             Past Medical History:   Diagnosis Date   • Fibroid 2019   • Sleep apnea        Past Surgical History:   Procedure Laterality Date   • CERVICAL CONE BIOPSY     •  SECTION     • HERNIA REPAIR     • HYSTERECTOMY  2019   • [place] LAPAROSCOPY W/RMVL ADNEXAL STRUCTURES Bilateral 11/15/2019    Procedure: SALPINGECTOMY, LAPAROSCOPIC;  Surgeon: Bel Woodruff MD;  [facility];  Service: Gynecology   • [place] LAPS W/VAG HYSTERECT 250 GM/&RMVL TUBE&/OVARIES N/A 11/15/2019    Procedure: HYSTERECTOMY LAPAROSCOPIC ASSISTED VAGINAL (LAVH);   Surgeon: Bel Woodruff MD;  [facility];  Service: Gynecology       Family History   Problem Relation Age of Onset   • Hypertension Mother    • Diabetes Father    • Heart disease Father    • Lymphoma Sister 47        non hodgkins   • Cancer Sister         Non-hodgkin's Lymphoma   • No Known Problems Sister    • No Known Problems Maternal Grandmother    • No Known Problems Maternal Grandfather    • No Known Problems Paternal Grandmother    • No Known Problems Paternal Grandfather    • No Known Problems Daughter    • Stroke Maternal Aunt    • Stroke Maternal Aunt    • Stroke Maternal Aunt    • No Known Problems Son    • No Known Problems Maternal Uncle    • No Known Problems Maternal Uncle    • BRCA2 Positive Neg Hx    • BRCA2 Negative Neg Hx    • BRCA1 Positive Neg Hx    • BRCA1 Negative Neg Hx    • BRCA 1/2 Neg Hx    • Ovarian cancer Neg Hx    • Endometrial cancer Neg Hx    • Colon cancer Neg Hx    • Breast cancer additional onset Neg Hx    • Breast cancer Neg Hx        Social History     Socioeconomic History   • Marital status: /Civil Union     Spouse name: Not on file   • Number of children: Not on file   • Years of education: Not on file   • Highest education level: Not on file   Occupational History   • "Not on file   Tobacco Use   • Smoking status: Former     Packs/day: 0 50     Years: 7 00     Total pack years: 3 50     Types: Cigarettes     Start date: 1993     Quit date: 1999     Years since quittin 4   • Smokeless tobacco: Never   Vaping Use   • Vaping Use: Never used   Substance and Sexual Activity   • Alcohol use: Yes     Alcohol/week: 5 0 standard drinks of alcohol     Types: 5 Glasses of wine per week     Comment: social   • Drug use: No   • Sexual activity: Yes     Partners: Male     Birth control/protection: None     Comment: declines std testing   Other Topics Concern   • Not on file   Social History Narrative   • Not on file     Social Determinants of Health     Financial Resource Strain: Not on file   Food Insecurity: Not on file   Transportation Needs: Not on file   Physical Activity: Not on file   Stress: Not on file   Social Connections: Not on file   Intimate Partner Violence: Not on file   Housing Stability: Not on file         Current Outpatient Medications:   •  Multiple Vitamins-Minerals (ZINC PO), Take by mouth, Disp: , Rfl:     Allergies   Allergen Reactions   • Penicillins Rash       Objective   Vitals:    23 1006   BP: 122/86   BP Location: Left arm   Patient Position: Sitting   Cuff Size: Standard   Weight: 78 9 kg (174 lb)   Height: 5' 10\" (1 778 m)     Physical Exam  Vitals reviewed  HENT:      Head: Normocephalic and atraumatic  Cardiovascular:      Rate and Rhythm: Normal rate and regular rhythm  Pulmonary:      Effort: Pulmonary effort is normal       Breath sounds: Normal breath sounds  Chest:   Breasts:     Breasts are symmetrical       Right: No swelling, bleeding, inverted nipple, mass, nipple discharge, skin change or tenderness  Left: No swelling, bleeding, inverted nipple, mass, nipple discharge, skin change or tenderness  Abdominal:      General: Abdomen is flat  Bowel sounds are normal       Palpations: Abdomen is soft  Tenderness:  There " is no abdominal tenderness  There is no right CVA tenderness, left CVA tenderness or guarding  Genitourinary:     General: Normal vulva  Pubic Area: No rash  Labia:         Right: No rash, tenderness, lesion or injury  Left: No rash, tenderness, lesion or injury  Urethra: No prolapse, urethral pain, urethral swelling or urethral lesion  Vagina: Normal  No signs of injury and foreign body  No vaginal discharge or erythema  Uterus: Absent  Adnexa: Right adnexa normal and left adnexa normal    Musculoskeletal:      Cervical back: Neck supple  Lymphadenopathy:      Upper Body:      Right upper body: No axillary adenopathy  Left upper body: No axillary adenopathy  Skin:     General: Skin is warm and dry  Neurological:      Mental Status: She is alert and oriented to person, place, and time  Psychiatric:         Mood and Affect: Mood normal          Behavior: Behavior normal          Thought Content: Thought content normal          Judgment: Judgment normal          There are no Patient Instructions on file for this visit

## 2023-06-13 ENCOUNTER — ANNUAL EXAM (OUTPATIENT)
Dept: OBGYN CLINIC | Facility: CLINIC | Age: 53
End: 2023-06-13
Payer: COMMERCIAL

## 2023-06-13 VITALS
BODY MASS INDEX: 24.91 KG/M2 | HEIGHT: 70 IN | SYSTOLIC BLOOD PRESSURE: 122 MMHG | DIASTOLIC BLOOD PRESSURE: 86 MMHG | WEIGHT: 174 LBS

## 2023-06-13 DIAGNOSIS — Z01.419 WELL WOMAN EXAM: Primary | ICD-10-CM

## 2023-06-13 DIAGNOSIS — Z12.31 ENCOUNTER FOR SCREENING MAMMOGRAM FOR MALIGNANT NEOPLASM OF BREAST: ICD-10-CM

## 2023-06-13 PROCEDURE — S0612 ANNUAL GYNECOLOGICAL EXAMINA: HCPCS | Performed by: PHYSICIAN ASSISTANT

## 2023-11-30 ENCOUNTER — HOSPITAL ENCOUNTER (OUTPATIENT)
Dept: MAMMOGRAPHY | Facility: HOSPITAL | Age: 53
Discharge: HOME | End: 2023-11-30
Payer: COMMERCIAL

## 2023-11-30 VITALS — BODY MASS INDEX: 24.91 KG/M2 | WEIGHT: 174 LBS | HEIGHT: 70 IN

## 2023-11-30 DIAGNOSIS — R92.30 DENSE BREAST TISSUE: ICD-10-CM

## 2023-11-30 DIAGNOSIS — Z12.31 VISIT FOR SCREENING MAMMOGRAM: ICD-10-CM

## 2023-11-30 PROCEDURE — 77063 BREAST TOMOSYNTHESIS BI: CPT

## 2023-11-30 PROCEDURE — 77067 SCR MAMMO BI INCL CAD: CPT

## 2023-12-01 ENCOUNTER — TELEPHONE (OUTPATIENT)
Dept: HEMATOLOGY ONCOLOGY | Facility: CLINIC | Age: 53
End: 2023-12-01

## 2023-12-01 NOTE — TELEPHONE ENCOUNTER
Appointment Change  Cancel, Reschedule, Change to Virtual      Who are you speaking with? Patient   If it is not the patient, is the caller listed on the communication consent form? Yes   Which provider is the appointment scheduled with? SONI Ibarra   When was the original appointment scheduled? Please list date and time 12/15   At which location is the appointment scheduled to take place? [facility]   Was the appointment rescheduled? Was the appointment changed from an in person visit to a virtual visit? If so, please list the details of the change. no   What is the reason for the appointment change? Not need       Was STAR transport scheduled? No   Does STAR transport need to be scheduled for the new visit (if applicable) No   Does the patient need an infusion appointment rescheduled? No   Does the patient have an upcoming infusion appointment scheduled? If so, when? No   Is the patient undergoing chemotherapy? No   For appointments cancelled with less than 24 hours:  Was the no-show policy reviewed?  Yes

## 2024-02-21 PROBLEM — Z01.419 WOMEN'S ANNUAL ROUTINE GYNECOLOGICAL EXAMINATION: Status: RESOLVED | Noted: 2020-09-10 | Resolved: 2024-02-21

## 2024-05-03 ENCOUNTER — HOSPITAL ENCOUNTER (OUTPATIENT)
Dept: VASCULAR ULTRASOUND | Facility: HOSPITAL | Age: 54
Discharge: HOME | End: 2024-05-03
Attending: INTERNAL MEDICINE
Payer: COMMERCIAL

## 2024-05-03 ENCOUNTER — HOSPITAL ENCOUNTER (OUTPATIENT)
Dept: CT IMAGING | Facility: HOSPITAL | Age: 54
Discharge: HOME | End: 2024-05-03
Attending: INTERNAL MEDICINE
Payer: COMMERCIAL

## 2024-05-03 DIAGNOSIS — R59.0 LOCALIZED ENLARGED LYMPH NODES: ICD-10-CM

## 2024-05-03 PROCEDURE — 93880 EXTRACRANIAL BILAT STUDY: CPT | Performed by: SURGERY

## 2024-05-03 PROCEDURE — 70491 CT SOFT TISSUE NECK W/DYE: CPT

## 2024-05-03 PROCEDURE — 93880 EXTRACRANIAL BILAT STUDY: CPT

## 2024-05-03 RX ADMIN — IOHEXOL 70 ML: 350 INJECTION, SOLUTION INTRAVENOUS at 14:22

## 2024-07-16 NOTE — PROGRESS NOTES
Assessment & Plan   Problem List Items Addressed This Visit    None  Visit Diagnoses     Well woman exam    -  Primary    Encounter for screening mammogram for malignant neoplasm of breast        Relevant Orders    Mammo screening bilateral w 3d & cad          Discussion    All questions have been answered to her satisfaction  RTO for APE or sooner if needed      Subjective     HPI   Rola Graf is a 53 y.o. female who presents for annual well woman exam.     S/p hysterectomy  due to fibroids and menorrhagia    No vulvar itch/burn; No vaginal itch/burn; No abn discharge or odor; No urinary sx - burning/pain/frequency/hematuria    No concerning breast masses, asymmetry, nipple discharge or bleeding, changes in skin of breast, or breast tenderness bilaterally    No abd/pelvic pain or HAs;     No problematic menopausal symptoms.    Pt is sexually active in a mutually monog/ sexual relationship; No issues with intercourse; She declines sti/hiv/hep testing; Feels safe at home  Current contraception: NA    (+) PCP for routine Bw/care;    Last Pap : 9/10/20 WNL neg HPV  History of abnormal Pap smear: denies  Mammo:23 BIRADS 2   Colonoscopy: f/u due 3 years    Review of Systems   Constitutional: Negative.    Respiratory: Negative.     Gastrointestinal: Negative.    Endocrine: Negative.    Genitourinary: Negative.        The following portions of the patient's history were reviewed and updated as appropriate: allergies, current medications, past family history, past medical history, past social history, past surgical history, and problem list.         OB History        2    Para   2    Term   2            AB        Living   2       SAB        IAB        Ectopic        Multiple        Live Births   2           Obstetric Comments   Age of menarche: 15  2 pregnancies: 2 live births 1 boy 1 girl             Past Medical History:   Diagnosis Date   • Fibroid 2019   • Sleep apnea         Past Surgical History:   Procedure Laterality Date   • CERVICAL CONE BIOPSY     •  SECTION     • HERNIA REPAIR     • HYSTERECTOMY  2019   • [place] LAPAROSCOPY W/RMVL ADNEXAL STRUCTURES Bilateral 11/15/2019    Procedure: SALPINGECTOMY, LAPAROSCOPIC;  Surgeon: Yenifer Adorno MD;  [facility];  Service: Gynecology   • [place] LAPS W/VAG HYSTERECT 250 GM/&RMVL TUBE&/OVARIES N/A 11/15/2019    Procedure: HYSTERECTOMY LAPAROSCOPIC ASSISTED VAGINAL (LAVH);  Surgeon: Yenifer Adorno MD;  [facility];  Service: Gynecology       Family History   Problem Relation Age of Onset   • Hypertension Mother    • Diabetes Father    • Heart disease Father    • Lymphoma Sister 54        non hodgkins   • Cancer Sister         Non-hodgkin's Lymphoma   • No Known Problems Sister    • No Known Problems Maternal Grandmother    • No Known Problems Maternal Grandfather    • No Known Problems Paternal Grandmother    • No Known Problems Paternal Grandfather    • No Known Problems Daughter    • Stroke Maternal Aunt    • Stroke Maternal Aunt    • Stroke Maternal Aunt    • No Known Problems Son    • No Known Problems Maternal Uncle    • No Known Problems Maternal Uncle    • BRCA2 Positive Neg Hx    • BRCA2 Negative Neg Hx    • BRCA1 Positive Neg Hx    • BRCA1 Negative Neg Hx    • BRCA 1/2 Neg Hx    • Ovarian cancer Neg Hx    • Endometrial cancer Neg Hx    • Colon cancer Neg Hx    • Breast cancer additional onset Neg Hx    • Breast cancer Neg Hx        Social History     Socioeconomic History   • Marital status: /Civil Union     Spouse name: Not on file   • Number of children: Not on file   • Years of education: Not on file   • Highest education level: Not on file   Occupational History   • Not on file   Tobacco Use   • Smoking status: Former     Current packs/day: 0.00     Average packs/day: 0.5 packs/day for 7.0 years (3.5 ttl pk-yrs)     Types: Cigarettes     Start date: 1993     Quit date: 1999     Years since  "quittin.5   • Smokeless tobacco: Never   Vaping Use   • Vaping status: Never Used   Substance and Sexual Activity   • Alcohol use: Yes     Alcohol/week: 5.0 standard drinks of alcohol     Types: 5 Glasses of wine per week     Comment: social   • Drug use: No   • Sexual activity: Yes     Partners: Male     Birth control/protection: None     Comment: declines std testing   Other Topics Concern   • Not on file   Social History Narrative   • Not on file     Social Determinants of Health     Financial Resource Strain: Not on file   Food Insecurity: Not on file   Transportation Needs: Not on file   Physical Activity: Not on file   Stress: Not on file   Social Connections: Not on file   Intimate Partner Violence: Not on file   Housing Stability: Not on file         Current Outpatient Medications:   •  Multiple Vitamins-Minerals (ZINC PO), Take by mouth, Disp: , Rfl:     Allergies   Allergen Reactions   • Penicillins Rash       Objective   Vitals:    24 1151   BP: 148/98   BP Location: Left arm   Patient Position: Sitting   Cuff Size: Standard   Weight: 77.6 kg (171 lb)   Height: 5' 10\" (1.778 m)     Physical Exam  Vitals reviewed.   HENT:      Head: Normocephalic and atraumatic.   Cardiovascular:      Rate and Rhythm: Normal rate and regular rhythm.   Pulmonary:      Effort: Pulmonary effort is normal.      Breath sounds: Normal breath sounds.   Chest:   Breasts:     Breasts are symmetrical.      Right: No swelling, bleeding, inverted nipple, mass, nipple discharge, skin change or tenderness.      Left: No swelling, bleeding, inverted nipple, mass, nipple discharge, skin change or tenderness.   Abdominal:      General: Abdomen is flat. Bowel sounds are normal.      Palpations: Abdomen is soft.      Tenderness: There is no abdominal tenderness. There is no right CVA tenderness, left CVA tenderness or guarding.   Genitourinary:     General: Normal vulva.      Pubic Area: No rash.       Labia:         Right: No " rash, tenderness, lesion or injury.         Left: No rash, tenderness, lesion or injury.       Urethra: No prolapse, urethral pain, urethral swelling or urethral lesion.      Vagina: Normal. No signs of injury and foreign body. No vaginal discharge or erythema.      Uterus: Absent.       Adnexa: Right adnexa normal and left adnexa normal.   Musculoskeletal:      Cervical back: Neck supple.   Lymphadenopathy:      Upper Body:      Right upper body: No axillary adenopathy.      Left upper body: No axillary adenopathy.   Skin:     General: Skin is warm and dry.   Neurological:      Mental Status: She is alert and oriented to person, place, and time.   Psychiatric:         Mood and Affect: Mood normal.         Behavior: Behavior normal.         Thought Content: Thought content normal.         Judgment: Judgment normal.         There are no Patient Instructions on file for this visit.

## 2024-07-17 ENCOUNTER — ANNUAL EXAM (OUTPATIENT)
Dept: OBGYN CLINIC | Facility: CLINIC | Age: 54
End: 2024-07-17
Payer: COMMERCIAL

## 2024-07-17 VITALS
WEIGHT: 171 LBS | HEIGHT: 70 IN | DIASTOLIC BLOOD PRESSURE: 98 MMHG | SYSTOLIC BLOOD PRESSURE: 148 MMHG | BODY MASS INDEX: 24.48 KG/M2

## 2024-07-17 DIAGNOSIS — Z01.419 WELL WOMAN EXAM: Primary | ICD-10-CM

## 2024-07-17 DIAGNOSIS — Z12.31 ENCOUNTER FOR SCREENING MAMMOGRAM FOR MALIGNANT NEOPLASM OF BREAST: ICD-10-CM

## 2024-07-17 PROCEDURE — S0612 ANNUAL GYNECOLOGICAL EXAMINA: HCPCS | Performed by: PHYSICIAN ASSISTANT

## 2025-01-29 ENCOUNTER — HOSPITAL ENCOUNTER (OUTPATIENT)
Dept: MAMMOGRAPHY | Facility: HOSPITAL | Age: 55
Discharge: HOME | End: 2025-01-29
Payer: COMMERCIAL

## 2025-01-29 VITALS — WEIGHT: 171 LBS | BODY MASS INDEX: 24.48 KG/M2 | HEIGHT: 70 IN

## 2025-01-29 DIAGNOSIS — Z12.31 ENCOUNTER FOR SCREENING MAMMOGRAM FOR MALIGNANT NEOPLASM OF BREAST: ICD-10-CM

## 2025-01-29 PROCEDURE — 77063 BREAST TOMOSYNTHESIS BI: CPT

## 2025-01-29 PROCEDURE — 77067 SCR MAMMO BI INCL CAD: CPT

## 2025-02-04 ENCOUNTER — RESULTS FOLLOW-UP (OUTPATIENT)
Dept: OBGYN CLINIC | Facility: CLINIC | Age: 55
End: 2025-02-04

## 2025-06-03 ENCOUNTER — TELEPHONE (OUTPATIENT)
Age: 55
End: 2025-06-03

## 2025-06-03 NOTE — TELEPHONE ENCOUNTER
06/03/25  Screened by: Khloe Ritchie    Referring Provider 3Y RECALL    Pre- Screening:     There is no height or weight on file to calculate BMI.  BMI - 24.54  Has patient been referred for a routine screening Colonoscopy? yes  Is the patient between 45-75 years old? yes      Previous Colonoscopy yes   If yes:    Date: 5/20/2022    Facility:     Reason: SCREENING           Does the patient want to see a Gastroenterologist prior to their procedure OR are they having any GI symptoms? no    Has the patient been hospitalized or had abdominal surgery in the past 6 months? no    Does the patient use supplemental oxygen? no    Does the patient take Coumadin, Lovenox, Plavix, Elliquis, Xarelto, or other blood thinning medication? no    Has the patient had a stroke, cardiac event, or stent placed in the past year? no      If patient is between 45yrs - 49yrs, please advise patient that we will have to confirm benefits & coverage with their insurance company for a routine screening colonoscopy.

## 2025-06-03 NOTE — TELEPHONE ENCOUNTER
Scheduled date of colonoscopy (as of today): Wednesday 9/24/2025  Physician performing colonoscopy: Dr Gonzalez  Location of colonoscopy: [facility]  Bowel prep reviewed with patient: Erica Stokes  Instructions reviewed with patient by: CATRACHITA - [employer]  Clearances: N/A

## 2025-06-03 NOTE — LETTER
Stephanie Canela,        Attached are your prep instructions for your upcoming colonoscopy scheduled on Wednesday 9/24/2025 with Dr. Gonzalez.  On Tuesday 9/23/2025 the GI lab will be calling between 2pm and 6pm with your arrival time for your procedure.   If you have any questions, please feel free to contact our office at [phone].    Address to our location:    [facility] [place]  [address]  [address]  [place], [place], [address]          Thank you for choosing [facility]!

## 2025-06-06 ENCOUNTER — HOSPITAL ENCOUNTER (EMERGENCY)
Facility: HOSPITAL | Age: 55
Discharge: HOME | End: 2025-06-06
Attending: EMERGENCY MEDICINE
Payer: COMMERCIAL

## 2025-06-06 VITALS
OXYGEN SATURATION: 97 % | TEMPERATURE: 98.8 F | RESPIRATION RATE: 16 BRPM | HEART RATE: 61 BPM | SYSTOLIC BLOOD PRESSURE: 142 MMHG | DIASTOLIC BLOOD PRESSURE: 78 MMHG

## 2025-06-06 DIAGNOSIS — E87.6 HYPOKALEMIA: ICD-10-CM

## 2025-06-06 DIAGNOSIS — R00.2 PALPITATIONS: Primary | ICD-10-CM

## 2025-06-06 LAB
ANION GAP SERPL CALCULATED.3IONS-SCNC: 10 MMOL/L (ref 4–13)
BUN SERPL-MCNC: 9 MG/DL (ref 5–25)
CALCIUM SERPL-MCNC: 9.4 MG/DL (ref 8.4–10.2)
CHLORIDE SERPL-SCNC: 102 MMOL/L (ref 96–108)
CO2 SERPL-SCNC: 25 MMOL/L (ref 21–32)
CREAT SERPL-MCNC: 0.53 MG/DL (ref 0.6–1.3)
GFR SERPL CREATININE-BSD FRML MDRD: 109 ML/MIN/1.73SQ M
GLUCOSE SERPL-MCNC: 93 MG/DL (ref 65–140)
POTASSIUM SERPL-SCNC: 3.4 MMOL/L (ref 3.5–5.3)
SODIUM SERPL-SCNC: 137 MMOL/L (ref 135–147)
TSH SERPL DL<=0.05 MIU/L-ACNC: 2.22 UIU/ML (ref 0.45–4.5)

## 2025-06-06 PROCEDURE — 99285 EMERGENCY DEPT VISIT HI MDM: CPT | Performed by: PHYSICIAN ASSISTANT

## 2025-06-06 PROCEDURE — 93005 ELECTROCARDIOGRAM TRACING: CPT

## 2025-06-06 PROCEDURE — 80048 BASIC METABOLIC PNL TOTAL CA: CPT | Performed by: PHYSICIAN ASSISTANT

## 2025-06-06 PROCEDURE — 99284 EMERGENCY DEPT VISIT MOD MDM: CPT

## 2025-06-06 PROCEDURE — 36415 COLL VENOUS BLD VENIPUNCTURE: CPT | Performed by: PHYSICIAN ASSISTANT

## 2025-06-06 PROCEDURE — 84443 ASSAY THYROID STIM HORMONE: CPT | Performed by: PHYSICIAN ASSISTANT

## 2025-06-06 RX ORDER — POTASSIUM CHLORIDE 1500 MG/1
20 TABLET, EXTENDED RELEASE ORAL ONCE
Status: COMPLETED | OUTPATIENT
Start: 2025-06-06 | End: 2025-06-06

## 2025-06-06 RX ADMIN — POTASSIUM CHLORIDE 20 MEQ: 1500 TABLET, EXTENDED RELEASE ORAL at 17:17

## 2025-06-06 NOTE — DISCHARGE INSTRUCTIONS
Please call the [facility] in [place] on Monday for follow-up.    The Holter monitor department will be calling you    Follow-up with the sleep medicine department    Return with any worsening symptoms questions comments or concerns

## 2025-06-06 NOTE — ED PROVIDER NOTES
Time reflects when diagnosis was documented in both MDM as applicable and the Disposition within this note       Time User Action Codes Description Comment    6/6/2025  4:17 PM Jaime Mendoza [R00.2] Palpitations     6/6/2025  5:12 PM Jaime Mendoza [E87.6] Hypokalemia           ED Disposition       ED Disposition   Discharge    Condition   Stable    Date/Time   Fri Jun 6, 2025  5:12 PM    Comment   Rola Graf discharge to home/self care.                   Assessment & Plan       Medical Decision Making  54-year-old female patient who presents with palpitations in which she thinks is A-fib because her watch told her so this occurred 2 hours last night and ended she had not had symptoms since.  She did not have chest pain or shortness of breath with this.  She does have a strong family history of A-fib and strokes which makes her very anxious.  Differential diagnose includes not limited to A-fib (not currently per EKG), electrolyte abnormality, thyroid abnormality, anxiety    Problems Addressed:  Hypokalemia: acute illness or injury     Details: Patient potassium was only 0.1 lower than normal range I did provide her with K-Dur however I do not believe that explains her palpitations  Palpitations: acute illness or injury     Details: It is unclear whether the patient was in A-fib, having palpitations, having anxiety.  Regardless patient was given ambulatory referral to cardiology and sleep medicine for questionable sleep apnea.  I also ordered a Holter monitor.  During her visit she was in sinus bradycardia the entire time without ectopy.  Is unclear if the Apple watch was correct    Amount and/or Complexity of Data Reviewed  Labs: ordered. Decision-making details documented in ED Course.     Details: All labs were reviewed TSH was normal only abnormality required intervention was potassium of 3.4  ECG/medicine tests: ordered and independent interpretation performed. Decision-making details  documented in ED Course.     Details: I personally interpreted the EKG there is no sign of A-fib ectopy or ST elevation  Discussion of management or test interpretation with external provider(s): Using joint decision-making with the patient she was very agreeable to discharge and was more at ease.  She was made aware to return with any worsening symptoms questions comments or concerns.  We discussed her referrals to both cardiologist and sleep medicine and to call for an Holter monitor.  She verbalized understanding and agreement and was grateful    Risk  Prescription drug management.        ED Course as of 06/06/25 1800   Fri Jun 06, 2025   1629 Initial EKG interpreted by me 50 beats minute sinus bradycardia no ST elevation normal axis QTc 405 similar to previous       Medications   potassium chloride (Klor-Con M20) CR tablet 20 mEq (20 mEq Oral Given 6/6/25 1717)       ED Risk Strat Scores                    No data recorded                            History of Present Illness       Chief Complaint   Patient presents with    Palpitations     Pt reports that she has been getting afib notifications on her apple watch, reports palpitations       Past Medical History[1]   Past Surgical History[2]   Family History[3]   Social History[4]   E-Cigarette/Vaping    E-Cigarette Use Never User       E-Cigarette/Vaping Substances    Nicotine No     THC No     CBD No     Flavoring No     Other No     Unknown No       I have reviewed and agree with the history as documented.     This is a 54-year-old female patient who last night for approximately 2 hours felt palpitations and tachycardia her Apple Watch told her that she was in A-fib however she is not sure if it is real heart rate was just anxiety because she became very nervous when her watch told her she had A-fib because she has a strong family history for A-fib and strokes.  She had no chest pain or shortness of breath with this no chest pressure.  She called her PCP who  had her come here.  William Kiran nurse practitioner called me to make me aware that she will be coming in about the water signal A-fib only 1 time last night.  She had a similar episode with her watch 1 month ago.  Patient did have blood work drawn this morning for annual monitoring.  She had a through [facility] that is unable to be visualized.  Since the 2-hour episode last night she has not had any palpitations or issues.  She just very nervous that she went to A-fib.  Her  does endorse that the patient does have mild sleep apnea because he feels that she holds her breath.  We had a long discussion even her show blood work this morning she is willing to have a TSH and electrolytes drawn tonight to make sure there is not any abnormalities which could be giving her palpitations.  At this time she is not in A-fib she is in sinus bradycardia which was similar to previous in 2017.  We also discussed that at this time I will order a 48-hour Holter monitor placed with cardiology consult and sleep medicine consult that she can do as an outpatient.  She is very agreeable to this        Review of Systems   Constitutional:  Negative for chills, diaphoresis, fatigue and fever.   HENT:  Negative for congestion, ear pain, nosebleeds and sore throat.    Eyes:  Negative for photophobia, pain, discharge and visual disturbance.   Respiratory:  Negative for cough, choking, chest tightness, shortness of breath and wheezing.    Cardiovascular:  Positive for palpitations (resolved). Negative for chest pain and leg swelling.   Gastrointestinal:  Negative for abdominal distention, abdominal pain, diarrhea and vomiting.   Genitourinary:  Negative for dysuria, flank pain, frequency and hematuria.   Musculoskeletal:  Negative for arthralgias, back pain, gait problem and joint swelling.   Skin:  Negative for color change and rash.   Neurological:  Negative for dizziness, seizures, syncope and headaches.   Psychiatric/Behavioral:  Negative  for behavioral problems and confusion. The patient is nervous/anxious (resolved).    All other systems reviewed and are negative.          Objective       ED Triage Vitals   Temperature Pulse Blood Pressure Respirations SpO2 Patient Position - Orthostatic VS   06/06/25 1610 06/06/25 1607 06/06/25 1609 06/06/25 1606 06/06/25 1607 06/06/25 1606   98.8 °F (37.1 °C) 71 157/81 18 99 % Sitting      Temp Source Heart Rate Source BP Location FiO2 (%) Pain Score    06/06/25 1610 06/06/25 1606 06/06/25 1606 -- --    Oral Monitor Right arm        Vitals      Date and Time Temp Pulse SpO2 Resp BP Pain Score FACES Pain Rating User   06/06/25 1715 -- 61 97 % 16 142/78 -- -- MONIE   06/06/25 1610 98.8 °F (37.1 °C) -- -- -- -- -- -- ALG   06/06/25 1609 -- -- -- -- 157/81 -- -- ALG   06/06/25 1607 -- 71 99 % -- -- -- -- ALG   06/06/25 1606 -- -- -- 18 -- -- -- ALG            Physical Exam  Vitals and nursing note reviewed.   Constitutional:       General: She is not in acute distress.     Appearance: Normal appearance. She is not ill-appearing, toxic-appearing or diaphoretic.   HENT:      Head: Normocephalic and atraumatic.      Right Ear: Tympanic membrane, ear canal and external ear normal.      Left Ear: Tympanic membrane, ear canal and external ear normal.      Nose: Nose normal. No congestion or rhinorrhea.      Mouth/Throat:      Mouth: Mucous membranes are moist.      Pharynx: Oropharynx is clear. No oropharyngeal exudate or posterior oropharyngeal erythema.     Eyes:      Extraocular Movements: Extraocular movements intact.      Conjunctiva/sclera: Conjunctivae normal.      Pupils: Pupils are equal, round, and reactive to light.       Cardiovascular:      Rate and Rhythm: Normal rate and regular rhythm.   Pulmonary:      Effort: Pulmonary effort is normal. No respiratory distress.      Breath sounds: Normal breath sounds.   Abdominal:      General: Bowel sounds are normal.      Palpations: Abdomen is soft.      Tenderness:  There is no abdominal tenderness.     Musculoskeletal:         General: Normal range of motion.      Cervical back: Normal range of motion and neck supple. No rigidity or tenderness.      Right lower leg: No edema.      Left lower leg: No edema.   Lymphadenopathy:      Cervical: No cervical adenopathy.     Skin:     General: Skin is warm and dry.      Capillary Refill: Capillary refill takes less than 2 seconds.      Findings: No rash.     Neurological:      General: No focal deficit present.      Mental Status: She is alert and oriented to person, place, and time. Mental status is at baseline.     Psychiatric:         Mood and Affect: Mood normal.         Behavior: Behavior normal.         Results Reviewed       Procedure Component Value Units Date/Time    TSH [[id number]]  (Normal) Collected: 06/06/25 1634    Lab Status: Final result Specimen: Blood from Arm, Left Updated: 06/06/25 1711     TSH 3RD GENERATON 2.217 uIU/mL     Basic metabolic panel [[id number]]  (Abnormal) Collected: 06/06/25 1634    Lab Status: Final result Specimen: Blood from Arm, Left Updated: 06/06/25 1655     Sodium 137 mmol/L      Potassium 3.4 mmol/L      Chloride 102 mmol/L      CO2 25 mmol/L      ANION GAP 10 mmol/L      BUN 9 mg/dL      Creatinine 0.53 mg/dL      Glucose 93 mg/dL      Calcium 9.4 mg/dL      eGFR 109 ml/min/1.73sq m     Narrative:      National Kidney Disease Foundation guidelines for Chronic Kidney Disease (CKD):     Stage 1 with normal or high GFR (GFR > 90 mL/min/1.73 square meters)    Stage 2 Mild CKD (GFR = 60-89 mL/min/1.73 square meters)    Stage 3A Moderate CKD (GFR = 45-59 mL/min/1.73 square meters)    Stage 3B Moderate CKD (GFR = 30-44 mL/min/1.73 square meters)    Stage 4 Severe CKD (GFR = 15-29 mL/min/1.73 square meters)    Stage 5 End Stage CKD (GFR <15 mL/min/1.73 square meters)  Note: GFR calculation is accurate only with a steady state creatinine            No orders to display       Procedures    ED  Medication and Procedure Management   Prior to Admission Medications   Prescriptions Last Dose Informant Patient Reported? Taking?   Multiple Vitamins-Minerals (ZINC PO)   Yes No   Sig: Take by mouth      Facility-Administered Medications: None     Discharge Medication List as of 2025  5:13 PM        CONTINUE these medications which have NOT CHANGED    Details   Multiple Vitamins-Minerals (ZINC PO) Take by mouth, Historical Med           Outpatient Discharge Orders   Ambulatory Referral to Cardiology   Standing Status: Future Standing Exp. Date: 26      Ambulatory Referral to Sleep Medicine   Standing Status: Future Standing Exp. Date: 26      Holter monitor   Standing Status: Future Standing Exp. Date: 29     ED SEPSIS DOCUMENTATION   Time reflects when diagnosis was documented in both MDM as applicable and the Disposition within this note       Time User Action Codes Description Comment    2025  4:17 PM Jaime Mendoza [R00.2] Palpitations     2025  5:12 PM Jaime Mendoza [E87.6] Hypokalemia                      [1]   Past Medical History:  Diagnosis Date    Fibroid 2019    Sleep apnea    [2]   Past Surgical History:  Procedure Laterality Date    CERVICAL CONE BIOPSY       SECTION      HERNIA REPAIR      HYSTERECTOMY  2019    CT LAPAROSCOPY W/RMVL ADNEXAL STRUCTURES Bilateral 11/15/2019    Procedure: SALPINGECTOMY, LAPAROSCOPIC;  Surgeon: Yenifer Adorno MD;  Location: [facility];  Service: Gynecology    CT LAPS W/VAG HYSTERECT 250 GM/&RMVL TUBE&/OVARIES N/A 11/15/2019    Procedure: HYSTERECTOMY LAPAROSCOPIC ASSISTED VAGINAL (LAVH);  Surgeon: Yenifer Adorno MD;  Location: [facility];  Service: Gynecology   [3]   Family History  Problem Relation Name Age of Onset    Hypertension Mother Father/Mother     Diabetes Father Father     Heart disease Father Father     Lymphoma Sister KG 54        non hodgkins    Cancer Sister KG         Non-hodgkin's Lymphoma    No  Known Problems Sister      No Known Problems Daughter      No Known Problems Maternal Grandmother      No Known Problems Maternal Grandfather      No Known Problems Paternal Grandmother      No Known Problems Paternal Grandfather      No Known Problems Son      Stroke Maternal Aunt      Stroke Maternal Aunt      Stroke Maternal Aunt      No Known Problems Maternal Uncle      No Known Problems Maternal Uncle      BRCA2 Positive Neg Hx      BRCA2 Negative Neg Hx      BRCA1 Positive Neg Hx      BRCA1 Negative Neg Hx      BRCA 1/2 Neg Hx      Ovarian cancer Neg Hx      Endometrial cancer Neg Hx      Colon cancer Neg Hx      Breast cancer additional onset Neg Hx      Breast cancer Neg Hx     [4]   Social History  Tobacco Use    Smoking status: Former     Current packs/day: 0.00     Average packs/day: 0.5 packs/day for 7.0 years (3.5 ttl pk-yrs)     Types: Cigarettes     Start date: 1993     Quit date: 1999     Years since quittin.4    Smokeless tobacco: Never   Vaping Use    Vaping status: Never Used   Substance Use Topics    Alcohol use: Yes     Alcohol/week: 5.0 standard drinks of alcohol     Types: 5 Glasses of wine per week     Comment: social    Drug use: No        Jaime Erickson PA-C  25 1800

## 2025-06-08 LAB
ATRIAL RATE: 52 BPM
P AXIS: 47 DEGREES
PR INTERVAL: 176 MS
QRS AXIS: 31 DEGREES
QRSD INTERVAL: 94 MS
QT INTERVAL: 436 MS
QTC INTERVAL: 405 MS
T WAVE AXIS: 26 DEGREES
VENTRICULAR RATE: 52 BPM

## 2025-06-08 PROCEDURE — 93010 ELECTROCARDIOGRAM REPORT: CPT | Performed by: INTERNAL MEDICINE

## 2025-06-09 ENCOUNTER — TELEPHONE (OUTPATIENT)
Age: 55
End: 2025-06-09

## 2025-06-09 NOTE — TELEPHONE ENCOUNTER
"Hello,    The following message was sent via e-mail to the leadership team:     Please advise if you can help facilitate the following overbook request:    Patient Name: Rola (patient)    Patient MRN: [id number]    Call back #: [phone]    Insurance: [facility].    Department:Cardiology    Speciality: General Cardiology    Reason for overbook request: STAT REFERRAL    Comments (Write \"N/a\" if no comments): \"ASAP\" Consult new patient with \"Routine\" Holter Monitor referral. Referred by Jaime Erickson. [facility] ED. Palpitations. Referral notes: \"Patient's iPhone stated that she had a bout of A-fib with a strong family history. Nothing seen in the emergency department.\" 6/17/25 Holter Monitor.    Requested doctor and location: Any Cardiologist. Patient requests [facility]. Note: Patient states not [place] offices as insurance has given issues in past with [place] office visits.    Date of current appointment: 9/18/25 (Dr. Waggoner)    Thank you.  "

## 2025-06-10 ENCOUNTER — TELEPHONE (OUTPATIENT)
Dept: SURGICAL ONCOLOGY | Facility: CLINIC | Age: 55
End: 2025-06-10

## 2025-06-10 NOTE — TELEPHONE ENCOUNTER
Left message for patient to schedule a follow appointment with Dr. Rankin. Left hopeline number for return call.

## 2025-06-19 ENCOUNTER — TELEPHONE (OUTPATIENT)
Dept: CARDIOLOGY CLINIC | Facility: CLINIC | Age: 55
End: 2025-06-19

## 2025-06-19 NOTE — TELEPHONE ENCOUNTER
It would probably make more sense to have the 2nd opinion with all the information available, for after the Zio has been resulted.

## 2025-06-19 NOTE — TELEPHONE ENCOUNTER
Called patient and left message to return call. Patient has an appointment tomorrow and trying to find out if she wore the holter monitor that was ordered for her on 06/06/25

## 2025-06-19 NOTE — TELEPHONE ENCOUNTER
Received call back from patient. She states she was referred to us from the ER, but saw a cardiologist with [facility] in the meantime and is currently on a Zio monitor. She would still like to be seen for a second opinion by our team, but since she has the Zio monitor on she cannot wear the holter monitor yet. She wants to know if appointment tomorrow should be rescheduled.

## 2025-06-19 NOTE — TELEPHONE ENCOUNTER
Spoke with pt and related Dr. Waggoner message as given - pt verbalized understanding.    Clerical- Can we help change new patient  appt with Dr. Waggoner  for around the third week in July as she is wearing a zio and is so return it July 2nd.

## 2025-07-30 ENCOUNTER — OFFICE VISIT (OUTPATIENT)
Dept: CARDIOLOGY CLINIC | Facility: CLINIC | Age: 55
End: 2025-07-30
Payer: COMMERCIAL

## 2025-07-30 VITALS
OXYGEN SATURATION: 97 % | DIASTOLIC BLOOD PRESSURE: 90 MMHG | SYSTOLIC BLOOD PRESSURE: 122 MMHG | WEIGHT: 168 LBS | BODY MASS INDEX: 24.11 KG/M2 | HEART RATE: 94 BPM

## 2025-07-30 DIAGNOSIS — I48.0 PAROXYSMAL ATRIAL FIBRILLATION (HCC): Primary | ICD-10-CM

## 2025-07-30 DIAGNOSIS — R00.2 PALPITATIONS: ICD-10-CM

## 2025-07-30 PROCEDURE — 99244 OFF/OP CNSLTJ NEW/EST MOD 40: CPT | Performed by: INTERNAL MEDICINE

## 2025-07-30 RX ORDER — METOPROLOL SUCCINATE 25 MG/1
25 TABLET, EXTENDED RELEASE ORAL DAILY
Qty: 90 TABLET | Refills: 3 | Status: SHIPPED | OUTPATIENT
Start: 2025-07-30

## (undated) DEVICE — SUT VICRYL 4-0 PS-2 18 IN J496G

## (undated) DEVICE — CHLORAPREP HI-LITE 26ML ORANGE

## (undated) DEVICE — TUBING SUCTION 5MM X 12 FT

## (undated) DEVICE — ADHESIVE SKN CLSR HISTOACRYL FLEX 0.5ML LF

## (undated) DEVICE — SCD SEQUENTIAL COMPRESSION COMFORT SLEEVE MEDIUM KNEE LENGTH: Brand: KENDALL SCD

## (undated) DEVICE — ENDOPATH XCEL BLADELESS TROCARS WITH STABILITY SLEEVES: Brand: ENDOPATH XCEL

## (undated) DEVICE — PREMIUM DRY TRAY LF: Brand: MEDLINE INDUSTRIES, INC.

## (undated) DEVICE — GLOVE PI ULTRA TOUCH SZ.6.5

## (undated) DEVICE — NEEDLE 25G X 1 1/2

## (undated) DEVICE — INTENDED FOR TISSUE SEPARATION, AND OTHER PROCEDURES THAT REQUIRE A SHARP SURGICAL BLADE TO PUNCTURE OR CUT.: Brand: BARD-PARKER SAFETY BLADES SIZE 11, STERILE

## (undated) DEVICE — X-RAY DETECTABLE SPONGES,16 PLY: Brand: VISTEC

## (undated) DEVICE — INTENDED FOR TISSUE SEPARATION, AND OTHER PROCEDURES THAT REQUIRE A SHARP SURGICAL BLADE TO PUNCTURE OR CUT.: Brand: BARD-PARKER SAFETY BLADES SIZE 10, STERILE

## (undated) DEVICE — 1840 FOAM BLOCK NEEDLE COUNTER: Brand: DEVON

## (undated) DEVICE — GLOVE INDICATOR PI UNDERGLOVE SZ 6.5 BLUE

## (undated) DEVICE — TRAY FOLEY 16FR URIMETER SILICONE SURESTEP

## (undated) DEVICE — ANTI-FOG SOLUTION WITH FOAM PAD: Brand: DEVON

## (undated) DEVICE — GLOVE INDICATOR PI UNDERGLOVE SZ 8 BLUE

## (undated) DEVICE — MEDI-VAC YANK SUCT HNDL W/TPRD BULBOUS TIP: Brand: CARDINAL HEALTH

## (undated) DEVICE — INSUFLATION TUBING INSUFLOW (LEXION)

## (undated) DEVICE — ADHESIVE SKIN HIGH VISCOSITY EXOFIN MICRO 0.5ML

## (undated) DEVICE — IRRIG ENDO FLO TUBING

## (undated) DEVICE — TROCAR: Brand: KII® SLEEVE

## (undated) DEVICE — FLOSEAL HEMOSTATIC MATRIX, 10 ML: Brand: FLOSEAL

## (undated) DEVICE — TROCAR: Brand: KII FIOS FIRST ENTRY

## (undated) DEVICE — ENSEAL 20 CM SHAFT, LARGE JAW: Brand: ENSEAL X1

## (undated) DEVICE — SUT VICRYL 1 CT-1 CR/8 27 IN JJ40G

## (undated) DEVICE — BETHLEHEM UNIVERSAL GYN LAP PK: Brand: CARDINAL HEALTH

## (undated) DEVICE — ENSEAL LAPAROSCOPIC TISSUE SEALER G2 CURVED JAW FOR USE WITH G2 GENERATOR 5MM DIAMETER 35CM SHAFT LENGTH: Brand: ENSEAL

## (undated) DEVICE — SUT VICRYL 4-0 PS-2 27 IN J426H

## (undated) DEVICE — 3000CC GUARDIAN II: Brand: GUARDIAN

## (undated) DEVICE — UNDYED BRAIDED (POLYGLACTIN 910), SYNTHETIC ABSORBABLE SUTURE: Brand: COATED VICRYL

## (undated) DEVICE — ALL PURPOSE SPONGES,NONWOVEN, 4 PLY: Brand: CURITY

## (undated) DEVICE — CHLORHEXIDINE 4PCT 4 OZ

## (undated) DEVICE — DRAPE SHEET THREE QUARTER